# Patient Record
Sex: FEMALE | Race: WHITE | NOT HISPANIC OR LATINO | Employment: STUDENT | URBAN - METROPOLITAN AREA
[De-identification: names, ages, dates, MRNs, and addresses within clinical notes are randomized per-mention and may not be internally consistent; named-entity substitution may affect disease eponyms.]

---
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
## 2022-03-28 ENCOUNTER — OFFICE VISIT (OUTPATIENT)
Dept: URGENT CARE | Facility: CLINIC | Age: 22
End: 2022-03-28
Payer: COMMERCIAL

## 2022-03-28 VITALS
BODY MASS INDEX: 33.49 KG/M2 | WEIGHT: 201 LBS | SYSTOLIC BLOOD PRESSURE: 122 MMHG | HEART RATE: 79 BPM | TEMPERATURE: 96.8 F | RESPIRATION RATE: 18 BRPM | DIASTOLIC BLOOD PRESSURE: 60 MMHG | HEIGHT: 65 IN | OXYGEN SATURATION: 98 %

## 2022-03-28 DIAGNOSIS — H65.93 OME (OTITIS MEDIA WITH EFFUSION), BILATERAL: Primary | ICD-10-CM

## 2022-03-28 DIAGNOSIS — J04.0 ACUTE LARYNGITIS: ICD-10-CM

## 2022-03-28 PROCEDURE — 99203 OFFICE O/P NEW LOW 30 MIN: CPT | Performed by: PHYSICIAN ASSISTANT

## 2022-03-28 RX ORDER — METHYLPREDNISOLONE 4 MG/1
TABLET ORAL
Qty: 21 TABLET | Refills: 0 | Status: SHIPPED | OUTPATIENT
Start: 2022-03-28

## 2022-03-28 NOTE — PROGRESS NOTES
[facility]        NAME: Radha Benitez is a 25 y o  female  : 2000    MRN: [id number]  DATE: 2022  TIME: 9:55 AM    Assessment and Plan   OME (otitis media with effusion), bilateral [H65 [id number]]  1  OME (otitis media with effusion), bilateral  methylPREDNISolone 4 MG tablet therapy pack   2  Acute laryngitis       Recommend mucinex-d and flonase for symptomatic relief  Watch for signs of infection as discussed  Discussed strict return to care precautions as well as red flag symptoms which should prompt immediate ED referral  Pt verbalized understanding and is in agreement with plan  Please follow up with your primary care provider within the next week  Please remember that your visit today was with an urgent care provider and should not replace follow up with your primary care provider for chronic medical issues or annual physicals  Patient Instructions       Follow up with PCP in 3-5 days  Proceed to  ER if symptoms worsen  Chief Complaint     Chief Complaint   Patient presents with    Hearing Problem     loss of voice  since Friday  and hearing loss today         History of Present Illness       Radha Benitez is a(n) 25 y o  female presenting with URI symptoms x 4 days  Past medical history: none  Congestion: yes  Sore throat: no  Cough: no  Sputum production: no  Fever: no  Body aches: no  Loss of smell/taste: no  GI symptoms: no  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  OTC meds tried: none  Vaccinated against COVID19: yes        Review of Systems   Review of Systems   Constitutional: Negative for chills, diaphoresis, fatigue and fever  HENT: Positive for congestion, hearing loss, postnasal drip and voice change (hoarse)  Negative for ear pain, rhinorrhea, sinus pain, sneezing, sore throat and trouble swallowing  Eyes: Negative for pain and redness  Respiratory: Negative for cough, chest tightness, shortness of breath and wheezing      Cardiovascular: Negative for chest pain and leg swelling  Gastrointestinal: Negative for diarrhea, nausea and vomiting  Musculoskeletal: Negative for myalgias  Neurological: Negative for dizziness, weakness and headaches  Current Medications       Current Outpatient Medications:     acetaminophen (TYLENOL) 325 mg tablet, Take 650 mg by mouth every 6 (six) hours as needed for mild pain (Patient not taking: Reported on 3/28/2022 ), Disp: , Rfl:     ketorolac (TORADOL) 10 mg tablet, Take 1 tablet (10 mg total) by mouth every 6 (six) hours as needed for moderate pain for up to 5 days, Disp: 20 tablet, Rfl: 0    methylPREDNISolone 4 MG tablet therapy pack, Use as directed on package, Disp: 21 tablet, Rfl: 0    Current Allergies     Allergies as of 03/28/2022 - Reviewed 03/28/2022   Allergen Reaction Noted    Chocolate - food allergy Hives 07/22/2019            The following portions of the patient's history were reviewed and updated as appropriate: allergies, current medications, past family history, past medical history, past social history, past surgical history and problem list      Past Medical History:   Diagnosis Date    Ovarian cyst        Past Surgical History:   Procedure Laterality Date    NO PAST SURGERIES      [place] LAP,DIAGNOSTIC ABDOMEN N/A 10/20/2020    Procedure: Mar Fail;  Surgeon: Adán Ellis MD;  Location: [address] [place] [address];  Service: Gynecology       Family History   Problem Relation Age of Onset    Other Mother         Acid reflux    Allergy (severe) Mother         Sulfa drugs, penicillin      Cervical cancer Mother    [de-identified] Fibromyalgia Mother    [de-identified] Lupus Mother     Rheum arthritis Mother     Thyroid nodules Mother     Other Father         Acid reflux    Asthma Father     COPD Father     Crohn's disease Father     Lactose intolerance Father     Allergies Father         Seasonal          Medications have been verified          Objective   /60   Pulse 79   Temp (!) 96 8 °F (36 °C)   Resp 18   Ht 5' 5" (1 651 m)   Wt 91 2 kg (201 lb)   LMP 02/28/2022   SpO2 98%   BMI 33 45 kg/m²        Physical Exam     Physical Exam  Vitals and nursing note reviewed  Constitutional:       General: She is not in acute distress  Appearance: Normal appearance  She is not toxic-appearing  HENT:      Head: Normocephalic and atraumatic  Right Ear: Ear canal and external ear normal  A middle ear effusion (and negative cone of light) is present  Tympanic membrane is not erythematous or bulging  Left Ear: Ear canal and external ear normal  A middle ear effusion (and negative cone of light) is present  Tympanic membrane is not erythematous or bulging  Nose: No congestion  Mouth/Throat:      Mouth: Mucous membranes are moist       Pharynx: Oropharynx is clear  No oropharyngeal exudate or posterior oropharyngeal erythema  Eyes:      Conjunctiva/sclera: Conjunctivae normal       Pupils: Pupils are equal, round, and reactive to light  Cardiovascular:      Rate and Rhythm: Normal rate and regular rhythm  Heart sounds: Normal heart sounds  Pulmonary:      Effort: Pulmonary effort is normal  No respiratory distress  Breath sounds: Normal breath sounds  No wheezing, rhonchi or rales  Abdominal:      General: Abdomen is flat  Palpations: Abdomen is soft  Musculoskeletal:      Cervical back: Normal range of motion and neck supple  Skin:     General: Skin is warm and dry  Capillary Refill: Capillary refill takes less than 2 seconds  Neurological:      Mental Status: She is alert and oriented to person, place, and time     Psychiatric:         Behavior: Behavior normal

## 2022-03-28 NOTE — LETTER
March 28, 2022     Patient: Amee Jefferson   YOB: 2000   Date of Visit: 3/28/2022       To Whom it May Concern:    Amee Jefferson was seen in my clinic on 3/28/2022  She may return to work on 03/28/2022  If you have any questions or concerns, please don't hesitate to call           Sincerely,          Dawn Caro PA-C        CC: No Recipients

## 2022-12-08 ENCOUNTER — OFFICE VISIT (OUTPATIENT)
Dept: OBGYN CLINIC | Facility: CLINIC | Age: 22
End: 2022-12-08

## 2022-12-08 ENCOUNTER — APPOINTMENT (OUTPATIENT)
Dept: RADIOLOGY | Facility: CLINIC | Age: 22
End: 2022-12-08

## 2022-12-08 VITALS
DIASTOLIC BLOOD PRESSURE: 60 MMHG | SYSTOLIC BLOOD PRESSURE: 122 MMHG | WEIGHT: 201 LBS | HEART RATE: 79 BPM | HEIGHT: 65 IN | BODY MASS INDEX: 33.49 KG/M2

## 2022-12-08 DIAGNOSIS — R20.2 NUMBNESS AND TINGLING OF RIGHT ARM: Primary | ICD-10-CM

## 2022-12-08 DIAGNOSIS — R20.0 NUMBNESS AND TINGLING OF RIGHT ARM: Primary | ICD-10-CM

## 2022-12-08 DIAGNOSIS — G56.01 CARPAL TUNNEL SYNDROME ON RIGHT: ICD-10-CM

## 2022-12-08 DIAGNOSIS — M54.12 RADICULOPATHY, CERVICAL REGION: ICD-10-CM

## 2022-12-08 DIAGNOSIS — M54.2 NECK PAIN: ICD-10-CM

## 2022-12-08 NOTE — PROGRESS NOTES
Assessment/Plan:  1  Numbness and tingling of right arm  XR spine cervical 2 or 3 vw injury    Ambulatory Referral to PT/ [address] [facility] limited      2  Carpal tunnel syndrome on right  Ambulatory Referral to PT/OT Hand Therapy    [facility] limited      3  Radiculopathy, cervical region  Ambulatory Referral to PT/OT Hand Therapy        Leticia Anne has right-sided hand numbness which appears to be mostly consistent with carpal tunnel symptoms  She does have reproducible numbness with carpal tunnel testing today including a Durkan's test   Her cervical spine x-ray is unremarkable and I have low concern for any cervical radiculopathy although if her symptoms persist or worsen we could consider further imaging of the cervical spine  I recommended ultrasound of her right wrist to assess for carpal tunnel pathology  She was advised to continue sleeping in the night splint  Follow-up in the office after ultrasound is complete  Subjective:   Tao Handy is a 25 y o  female who presents to the office for evaluation for ongoing numbness in her right hand  She states she has been having issues in her right hand for the last 2 years  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[employer]  She will feel symptoms of numbness and tingling into the right hand that worsen when she is playing her instrument  It seems to come and go but is worsening more recently  She denies any trauma to the hand or elbow  She was told that it could be coming from her neck although she does not have any neck pain she did undergo some chiropractic adjustments which seem to help the situation sometimes  She was told she may have carpal tunnel symptoms in the past and was advised to wear a night splint on her wrist and states that this does not help    She feels numbness and tingling into all of her fingers but states that the middle finger seems to be the worst   She also feels a general soreness in her forearm with playing instruments as well       Review of Systems   Constitutional: Negative for chills, fever and unexpected weight change  HENT: Negative for hearing loss, nosebleeds and sore throat  Eyes: Negative for pain, redness and visual disturbance  Respiratory: Negative for cough, shortness of breath and wheezing  Cardiovascular: Negative for chest pain, palpitations and leg swelling  Gastrointestinal: Negative for abdominal pain, nausea and vomiting  Endocrine: Negative for polydipsia and polyuria  Genitourinary: Negative for dysuria and hematuria  Musculoskeletal:        See HPI   Skin: Negative for rash and wound  Neurological: Negative for dizziness, numbness and headaches  Psychiatric/Behavioral: Negative for decreased concentration and suicidal ideas  The patient is not nervous/anxious            Past Medical History:   Diagnosis Date   • Ovarian cyst        Past Surgical History:   Procedure Laterality Date   • NO PAST SURGERIES     • [place] LAP,DIAGNOSTIC ABDOMEN N/A 10/20/2020    Procedure: DIAGNOSTIC LAPAROSCOPY,  CYSTOSCOPY;  Surgeon: Tomas Cabot, MD;  Location: [address] [place] [address];  Service: Gynecology       Family History   Problem Relation Age of Onset   • Other Mother         Acid reflux   • Allergy (severe) Mother         Sulfa drugs, penicillin     • Cervical cancer Mother    • Fibromyalgia Mother    • Lupus Mother    • Rheum arthritis Mother    • Thyroid nodules Mother    • Other Father         Acid reflux   • Asthma Father    • COPD Father    • Crohn's disease Father    • Lactose intolerance Father    • Allergies Father         Seasonal        Social History     Occupational History   • Not on file   Tobacco Use   • Smoking status: Never   • Smokeless tobacco: Never   Substance and Sexual Activity   • Alcohol use: No   • Drug use: No   • Sexual activity: Yes         Current Outpatient Medications:   •  acetaminophen (TYLENOL) 325 mg tablet, Take 650 mg by mouth every 6 (six) hours as needed for mild pain (Patient not taking: Reported on 3/28/2022 ), Disp: , Rfl:   •  ketorolac (TORADOL) 10 mg tablet, Take 1 tablet (10 mg total) by mouth every 6 (six) hours as needed for moderate pain for up to 5 days, Disp: 20 tablet, Rfl: 0  •  methylPREDNISolone 4 MG tablet therapy pack, Use as directed on package, Disp: 21 tablet, Rfl: 0    Allergies   Allergen Reactions   • Chocolate - Food Allergy Hives       Objective:  Vitals:    12/08/22 1449   BP: 122/60   Pulse: 79       Right Hand Exam     Tenderness   Right hand tenderness location: Discomfort to palpation over flexor and extensor compartment of wrist and forearm  Range of Motion   Wrist   Extension: normal   Flexion: normal   Pronation: normal   Supination: normal     Muscle Strength   Wrist extension: 5/5   Wrist flexion: 5/5   : 5/5     Tests   Tinel's sign (median nerve): positive    Other   Erythema: absent  Sensation: normal  Pulse: present    Comments:  Positive Durkan's test          Strength/Myotome Testing     Right Wrist/Hand   Wrist extension: 5  Wrist flexion: 5    Tests     Right Wrist/Hand   Positive Tinel's sign (medial nerve)  Physical Exam  Vitals and nursing note reviewed  Constitutional:       Appearance: She is well-developed  HENT:      Head: Normocephalic and atraumatic  Eyes:      General: No scleral icterus  Extraocular Movements: Extraocular movements intact  Conjunctiva/sclera: Conjunctivae normal    Neck:      Comments: Discomfort the right arm with Spurling's maneuver  Cardiovascular:      Rate and Rhythm: Normal rate  Pulmonary:      Effort: Pulmonary effort is normal  No respiratory distress  Musculoskeletal:      Cervical back: Normal range of motion and neck supple  No spinous process tenderness or muscular tenderness  Normal range of motion  Comments: As noted in HPI   Skin:     General: Skin is warm and dry  Neurological:      Mental Status: She is alert and oriented to person, place, and time  Psychiatric:         Behavior: Behavior normal          I have personally reviewed pertinent films in PACS and my interpretation is as follows:  X-rays of the of the cervical spine demonstrate no evidence of acute fracture or significant degenerative change  This document was created using speech voice recognition software  Grammatical errors, random word insertions, pronoun errors, and incomplete sentences are an occasional consequence of this system due to software limitations, ambient noise, and hardware issues  Any formal questions or concerns about content, text, or information contained within the body of this dictation should be directly addressed to the provider for clarification

## 2023-01-09 ENCOUNTER — OFFICE VISIT (OUTPATIENT)
Dept: URGENT CARE | Facility: CLINIC | Age: 23
End: 2023-01-09

## 2023-01-09 VITALS
RESPIRATION RATE: 18 BRPM | WEIGHT: 212.8 LBS | BODY MASS INDEX: 35.45 KG/M2 | TEMPERATURE: 97.2 F | OXYGEN SATURATION: 97 % | HEART RATE: 71 BPM | HEIGHT: 65 IN

## 2023-01-09 DIAGNOSIS — H57.12 LEFT EYE PAIN: Primary | ICD-10-CM

## 2023-01-09 NOTE — PROGRESS NOTES
[facility] [facility]        NAME: Shaun Snider is a 25 y o  female  : 2000    MRN: [id number]  DATE: 2023  TIME: 12:33 PM    Assessment and Plan   Left eye pain [H57 12]  1  Left eye pain              Patient Instructions   Patient Instructions   I do not see a contact lens even with everting the eyelid  Follow-up with your eye doctor if pain persist         Follow up with PCP in 3-5 days  Proceed to  ER if symptoms worsen  Chief Complaint     Chief Complaint   Patient presents with   • Foreign Body in [address] [place] [address] in  eye         History of Present Illness       The patient is a 71-year-old female presenting today because she thinks her contact lenses stuck in her left eye  She denies any prior episodes  Is a contact wearer for the last 5 years  She reports pain in the left eye  Has been wearing glasses and has normal vision with the glasses  Review of Systems   Review of Systems   Eyes: Positive for pain and redness  Negative for photophobia, discharge, itching and visual disturbance           Current Medications       Current Outpatient Medications:   •  acetaminophen (TYLENOL) 325 mg tablet, Take 650 mg by mouth every 6 (six) hours as needed for mild pain (Patient not taking: Reported on 3/28/2022 ), Disp: , Rfl:   •  ketorolac (TORADOL) 10 mg tablet, Take 1 tablet (10 mg total) by mouth every 6 (six) hours as needed for moderate pain for up to 5 days, Disp: 20 tablet, Rfl: 0  •  methylPREDNISolone 4 MG tablet therapy pack, Use as directed on package, Disp: 21 tablet, Rfl: 0    Current Allergies     Allergies as of 2023 - Reviewed 2023   Allergen Reaction Noted   • Chocolate - food allergy Hives 2019            The following portions of the patient's history were reviewed and updated as appropriate: allergies, current medications, past family history, past medical history, past social history, past surgical history and problem list      Past Medical History:   Diagnosis Date   • Ovarian cyst        Past Surgical History:   Procedure Laterality Date   • NO PAST SURGERIES     • ND LAPS ABD PRTM&OMENTUM DX W/WO SPEC BR/WA SPX N/A 10/20/2020    Procedure: DIAGNOSTIC LAPAROSCOPY,  CYSTOSCOPY;  Surgeon: Kianna Saunders MD;  Location: [address] [place] [address];  Service: Gynecology       Family History   Problem Relation Age of Onset   • Other Mother         Acid reflux   • Allergy (severe) Mother         Sulfa drugs, penicillin     • Cervical cancer Mother    • Fibromyalgia Mother    • Lupus Mother    • Rheum arthritis Mother    • Thyroid nodules Mother    • Other Father         Acid reflux   • Asthma Father    • COPD Father    • Crohn's disease Father    • Lactose intolerance Father    • Allergies Father         Seasonal          Medications have been verified  Objective   Pulse 71   Temp (!) 97 2 °F (36 2 °C)   Resp 18   Ht 5' 5" (1 651 m)   Wt 96 5 kg (212 lb 12 8 oz)   SpO2 97%   BMI 35 41 kg/m²        Physical Exam     Physical Exam  Vitals and nursing note reviewed  Constitutional:       General: She is not in acute distress  Appearance: Normal appearance  She is normal weight  She is not ill-appearing, toxic-appearing or diaphoretic  HENT:      Head: Normocephalic and atraumatic  Eyes:      General: No scleral icterus  Right eye: No discharge  Left eye: No discharge  Extraocular Movements: Extraocular movements intact  Pupils: Pupils are equal, round, and reactive to light  Comments: Erythema of left conjunctiva   The eye was numbed and stained  There were multiple small areas of uptake which possibly represent corneal abrasions from trying to get the contact out  Cardiovascular:      Rate and Rhythm: Normal rate and regular rhythm  Heart sounds: Normal heart sounds  No murmur heard  No friction rub  No gallop  Pulmonary:      Effort: Pulmonary effort is normal  No respiratory distress        Breath sounds: Normal breath sounds  No stridor  No wheezing, rhonchi or rales  Chest:      Chest wall: No tenderness  Skin:     General: Skin is warm and dry  Capillary Refill: Capillary refill takes less than 2 seconds  Neurological:      Mental Status: She is alert

## 2023-01-09 NOTE — PATIENT INSTRUCTIONS
I do not see a contact lens even with everting the eyelid    Follow-up with your eye doctor if pain persist

## 2023-01-18 ENCOUNTER — OFFICE VISIT (OUTPATIENT)
Dept: GASTROENTEROLOGY | Facility: CLINIC | Age: 23
End: 2023-01-18

## 2023-01-18 VITALS
SYSTOLIC BLOOD PRESSURE: 120 MMHG | BODY MASS INDEX: 35.32 KG/M2 | HEIGHT: 65 IN | DIASTOLIC BLOOD PRESSURE: 75 MMHG | HEART RATE: 80 BPM | WEIGHT: 212 LBS

## 2023-01-18 DIAGNOSIS — K52.9 CHRONIC DIARRHEA: Primary | ICD-10-CM

## 2023-01-18 NOTE — PROGRESS NOTES
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  Presents with chronic diarrhea  Her frequent bowel motions associated with abdominal cramping started back in August   She can go 5-6 times a day she is occasionally awoken with years to go to the bathroom  She has not lost any weight  She denies any blood or fevers  No antibiotics or recent travel  Her father has Crohn's disease  To otherwise follow-up in 4 months   - CBC and Platelet; Future  - Comprehensive metabolic panel; Future  - C-reactive protein; Future  - Sedimentation rate, automated; Future  - Clostridium difficile toxin by PCR; Future  - Stool culture; Future  - White Blood Cells, Stool by Gram Stain; Future  - Ova and parasite examination; Future  - Celiac Disease Antibody Profile; Future  - Colonoscopy; Future  - CT abdomen pelvis w contrast; Future    ______________________________________________________________________    HPI: Very 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 presents with chronic diarrhea since August of this year  She has 5-6 bowel movements a day occasionally awoken  No blood no fevers no travel no antibiotics  Father has Crohn's disease otherwise no significant family history  She denies any upper GI symptoms  She has underlying PCO  REVIEW OF SYSTEMS:    CONSTITUTIONAL: Denies any fever, chills, rigors, and weight loss  HEENT: No earache or tinnitus  Denies hearing loss or visual disturbances  CARDIOVASCULAR: No chest pain or palpitations  RESPIRATORY: Denies any cough, hemoptysis, shortness of breath or dyspnea on exertion  GASTROINTESTINAL: As noted in the History of Present Illness  GENITOURINARY: No problems with urination  Denies any hematuria or dysuria    NEUROLOGIC: No dizziness or vertigo, denies headaches  MUSCULOSKELETAL: Denies any muscle or joint pain  SKIN: Denies skin rashes or itching  ENDOCRINE: Denies excessive thirst  Denies intolerance to heat or cold  PSYCHOSOCIAL: Denies depression or anxiety  Denies any recent memory loss  Historical Information   Past Medical History:   Diagnosis Date   • Ovarian cyst    PCO syndrome  Past Surgical History:   Procedure Laterality Date   • NO PAST SURGERIES     • [place] LAPS ABD PRTM&OMENTUM DX W/WO SPEC BR/WA SPX N/A 10/20/2020    Procedure: DIAGNOSTIC LAPAROSCOPY,  CYSTOSCOPY;  Surgeon: Morris So MD;  Location: [facility];  Service: Gynecology     Social History   Social History     Substance and Sexual Activity   Alcohol Use No     Social History     Substance and Sexual Activity   Drug Use No     Social History     Tobacco Use   Smoking Status Never   Smokeless Tobacco Never     Family History   Problem Relation Age of Onset   • Other Mother         Acid reflux   • Allergy (severe) Mother         Sulfa drugs, penicillin     • Cervical cancer Mother    • Fibromyalgia Mother    • Lupus Mother    • Rheum arthritis Mother    • Thyroid nodules Mother    • Other Father         Acid reflux   • Asthma Father    • COPD Father    • Crohn's disease Father    • Lactose intolerance Father    • Allergies Father         Seasonal        Meds/Allergies       Current Outpatient Medications:   •  acetaminophen (TYLENOL) 325 mg tablet  •  ketorolac (TORADOL) 10 mg tablet  •  methylPREDNISolone 4 MG tablet therapy pack    Allergies   Allergen Reactions   • Chocolate - Food Allergy Hives           Objective     Blood pressure 120/75, pulse 80, height 5' 5" (1 651 m), weight 96 2 kg (212 lb), not currently breastfeeding  Body mass index is 35 28 kg/m²          PHYSICAL EXAM:      General Appearance:   Alert, cooperative, no distress   HEENT:   Normocephalic, atraumatic, anicteric      Neck:  Supple, symmetrical, trachea midline   Lungs:   Clear to auscultation bilaterally; no rales, rhonchi or wheezing; respirations unlabored    Heart[de-identified]   Regular rate and rhythm; no murmur, rub, or gallop  Abdomen:   Soft, non-tender, non-distended; normal bowel sounds; no masses, no organomegaly    Genitalia:   Deferred    Rectal:   Deferred    Extremities:  No cyanosis, clubbing or edema    Pulses:  2+ and symmetric    Skin:  No jaundice, rashes, or lesions    Lymph nodes:  No palpable cervical lymphadenopathy        Lab Results:   No visits with results within 1 Day(s) from this visit  Latest known visit with results is:   Admission on 10/20/2020, Discharged on 10/20/2020   Component Date Value   • EXT Preg Test, Ur 10/20/2020 Negative    • Control 10/20/2020 Valid          Radiology Results:   No results found    Answers for HPI/ROS submitted by the patient on 1/17/2023  Chronicity: recurrent  Onset: more than 1 year ago  Onset quality: gradual  Frequency: daily  Episode duration: 3 Hours  Progression since onset: waxing and waning  Pain location: generalized abdominal region  Pain - numeric: 9/10  Pain quality: cramping  Radiates to: back, pelvis  anorexia: No  arthralgias: No  belching: No  constipation: Yes  diarrhea: Yes  dysuria: No  fever: No  flatus: Yes  frequency: No  headaches: Yes  hematochezia: No  hematuria: No  melena: No  myalgias: Yes  nausea: Yes  weight loss: No  vomiting: No  Aggravated by: certain positions, movement, palpation  Relieved by: nothing  Diagnostic workup: surgery, ultrasound

## 2023-01-18 NOTE — PATIENT INSTRUCTIONS
Scheduled date of colonoscopy (as of today): 2/24/23  Physician performing colonoscopy: Dr Jossie Alcantara  Location of colonoscopy: [facility]  Bowel prep reviewed with patient: Miralax/Dulcolax  Instructions reviewed with patient by:  Clearances:  N/A

## 2023-02-04 ENCOUNTER — HOSPITAL ENCOUNTER (OUTPATIENT)
Dept: CT IMAGING | Facility: HOSPITAL | Age: 23
Discharge: HOME | End: 2023-02-04
Attending: INTERNAL MEDICINE

## 2023-02-04 ENCOUNTER — LAB (OUTPATIENT)
Dept: LAB | Facility: HOSPITAL | Age: 23
End: 2023-02-04
Attending: INTERNAL MEDICINE

## 2023-02-04 DIAGNOSIS — K52.9 CHRONIC DIARRHEA: ICD-10-CM

## 2023-02-04 LAB
ALBUMIN SERPL BCP-MCNC: 4.4 G/DL (ref 3.5–5)
ALP SERPL-CCNC: 54 U/L (ref 34–104)
ALT SERPL W P-5'-P-CCNC: 10 U/L (ref 7–52)
ANION GAP SERPL CALCULATED.3IONS-SCNC: 9 MMOL/L (ref 4–13)
AST SERPL W P-5'-P-CCNC: 10 U/L (ref 13–39)
BILIRUB SERPL-MCNC: 0.56 MG/DL (ref 0.2–1)
BUN SERPL-MCNC: 10 MG/DL (ref 5–25)
CALCIUM SERPL-MCNC: 8.8 MG/DL (ref 8.4–10.2)
CHLORIDE SERPL-SCNC: 101 MMOL/L (ref 96–108)
CO2 SERPL-SCNC: 26 MMOL/L (ref 21–32)
CREAT SERPL-MCNC: 0.78 MG/DL (ref 0.6–1.3)
CRP SERPL QL: 5.8 MG/L
ERYTHROCYTE [DISTWIDTH] IN BLOOD BY AUTOMATED COUNT: 12.1 % (ref 11.6–15.1)
ERYTHROCYTE [SEDIMENTATION RATE] IN BLOOD: 11 MM/HOUR (ref 0–20)
GFR SERPL CREATININE-BSD FRML MDRD: 109 ML/MIN/1.73SQ M
GLUCOSE P FAST SERPL-MCNC: 93 MG/DL (ref 65–99)
HCT VFR BLD AUTO: 39 % (ref 34.8–46.1)
HGB BLD-MCNC: 12.9 G/DL (ref 11.5–15.4)
MCH RBC QN AUTO: 29.8 PG (ref 26.8–34.3)
MCHC RBC AUTO-ENTMCNC: 33.1 G/DL (ref 31.4–37.4)
MCV RBC AUTO: 90 FL (ref 82–98)
PLATELET # BLD AUTO: 214 THOUSANDS/UL (ref 149–390)
PMV BLD AUTO: 11.7 FL (ref 8.9–12.7)
POTASSIUM SERPL-SCNC: 3.6 MMOL/L (ref 3.5–5.3)
PROT SERPL-MCNC: 7.3 G/DL (ref 6.4–8.4)
RBC # BLD AUTO: 4.33 MILLION/UL (ref 3.81–5.12)
SODIUM SERPL-SCNC: 136 MMOL/L (ref 135–147)
WBC # BLD AUTO: 5.17 THOUSAND/UL (ref 4.31–10.16)

## 2023-02-04 RX ADMIN — IOHEXOL 100 ML: 350 INJECTION, SOLUTION INTRAVENOUS at 10:05

## 2023-02-06 LAB
ENDOMYSIUM IGA SER QL: NEGATIVE
GLIADIN PEPTIDE IGA SER-ACNC: 5 UNITS (ref 0–19)
GLIADIN PEPTIDE IGG SER-ACNC: 3 UNITS (ref 0–19)
IGA SERPL-MCNC: 294 MG/DL (ref 87–352)
TTG IGA SER-ACNC: <2 U/ML (ref 0–3)
TTG IGG SER-ACNC: 4 U/ML (ref 0–5)

## 2023-02-06 NOTE — RESULT ENCOUNTER NOTE
The results were unremarkable/negative except for one of the inflammatory markers which was minimally elevated  This was communicated on my chart

## 2023-06-10 ENCOUNTER — TELEPHONE (OUTPATIENT)
Dept: OTHER | Facility: OTHER | Age: 23
End: 2023-06-10

## 2023-06-12 ENCOUNTER — TELEPHONE (OUTPATIENT)
Dept: GASTROENTEROLOGY | Facility: CLINIC | Age: 23
End: 2023-06-12

## 2023-06-12 NOTE — TELEPHONE ENCOUNTER
Scheduled date of colonoscopy (as of today):06/27/2023  Physician performing colonoscopy:Dr Valente Mast  Location of colonoscopy:[facility]  Clearances: n/a

## 2023-06-12 NOTE — TELEPHONE ENCOUNTER
I lmom for pt to please call back to reschedule her colonoscopy with Dr Uli Hutchins  Will call pt again in one week if do not hear back from her

## 2023-06-18 RX ORDER — LEVONORGESTREL 52 MG/1
INTRAUTERINE DEVICE INTRAUTERINE
COMMUNITY
Start: 2022-07-07

## 2023-06-18 RX ORDER — KETOCONAZOLE 20 MG/G
CREAM TOPICAL
COMMUNITY
Start: 2023-06-09 | End: 2023-06-20

## 2023-06-18 RX ORDER — DOXYCYCLINE HYCLATE 100 MG/1
100 CAPSULE ORAL 2 TIMES DAILY
COMMUNITY
Start: 2023-06-09 | End: 2023-06-20

## 2023-06-20 ENCOUNTER — OFFICE VISIT (OUTPATIENT)
Dept: FAMILY MEDICINE CLINIC | Facility: CLINIC | Age: 23
End: 2023-06-20
Payer: COMMERCIAL

## 2023-06-20 VITALS
HEART RATE: 72 BPM | WEIGHT: 205.13 LBS | HEIGHT: 65 IN | SYSTOLIC BLOOD PRESSURE: 114 MMHG | DIASTOLIC BLOOD PRESSURE: 82 MMHG | TEMPERATURE: 98 F | RESPIRATION RATE: 18 BRPM | BODY MASS INDEX: 34.18 KG/M2

## 2023-06-20 DIAGNOSIS — Z00.00 HEALTHCARE MAINTENANCE: Primary | ICD-10-CM

## 2023-06-20 PROCEDURE — 99395 PREV VISIT EST AGE 18-39: CPT | Performed by: FAMILY MEDICINE

## 2023-06-20 NOTE — PROGRESS NOTES
"Assessment/Plan:    No problem-specific Assessment & Plan notes found for this encounter  cpe  Work form done  PPD not needed    bmi and wt loss discussed  Has PCOS     Diagnoses and all orders for this visit:    Healthcare maintenance    Other orders  -     Discontinue: doxycycline hyclate (VIBRAMYCIN) 100 mg capsule; Take 100 mg by mouth 2 (two) times a day (Patient not taking: Reported on 6/20/2023)  -     Discontinue: ketoconazole (NIZORAL) 2 % cream; APPLY CREAM TOPICALLY TWICE DAILY TO THE GROIN RASH FOR 1 TO 2 WEEKS (Patient not taking: Reported on 6/20/2023)  -     Levonorgestrel (Mirena, 52 MG,) 20 MCG/DAY IUD        No follow-ups on file  Subjective:      Patient ID: Angelia Galdamez is a 26 y o  female  Chief Complaint   Patient presents with   • Annual Exam     Lw cma       HPI    Flute and clarinet and sax    quantiferon done and normal    Has IUD  Sexually active, safe sex with condom  Hx of PCOS  Gyn Dr William Mooney [facility]    No mental issues    The following portions of the patient's history were reviewed and updated as appropriate: allergies, current medications, past family history, past medical history, past social history, past surgical history and problem list     Review of Systems   Respiratory: Negative for shortness of breath  Psychiatric/Behavioral: Negative for dysphoric mood  The patient is not nervous/anxious  Current Outpatient Medications   Medication Sig Dispense Refill   • Levonorgestrel (Mirena, 52 MG,) 20 MCG/DAY IUD        No current facility-administered medications for this visit  Objective:    /82   Pulse 72   Temp 98 °F (36 7 °C) (Tympanic)   Resp 18   Ht 5' 5\" (1 651 m)   Wt 93 kg (205 lb 2 oz)   LMP  (LMP Unknown)   BMI 34 13 kg/m²        Physical Exam  Vitals and nursing note reviewed  Constitutional:       General: She is not in acute distress  Appearance: She is well-developed  She is obese   She is not " ill-appearing or toxic-appearing  HENT:      Head: Normocephalic  Right Ear: Tympanic membrane and external ear normal       Left Ear: Tympanic membrane and external ear normal       Nose: No congestion  Mouth/Throat:      Pharynx: No oropharyngeal exudate  Eyes:      General: No scleral icterus  Conjunctiva/sclera: Conjunctivae normal    Cardiovascular:      Rate and Rhythm: Normal rate and regular rhythm  Heart sounds: No murmur heard  Pulmonary:      Effort: Pulmonary effort is normal  No respiratory distress  Breath sounds: No wheezing  Abdominal:      General: There is no distension  Palpations: Abdomen is soft  Tenderness: There is no abdominal tenderness  Musculoskeletal:         General: No deformity  Cervical back: Neck supple  Right lower leg: No edema  Left lower leg: No edema  Skin:     General: Skin is warm and dry  Coloration: Skin is not pale  Findings: No rash  Neurological:      Mental Status: She is alert  Gait: Gait normal       Deep Tendon Reflexes: Reflexes normal    Psychiatric:         Mood and Affect: Mood normal          Behavior: Behavior normal          Thought Content: Thought content normal        BMI Counseling: Body mass index is 34 13 kg/m²  The BMI is above normal  Nutrition recommendations include decreasing portion sizes and moderation in carbohydrate intake  Exercise recommendations include exercising 3-5 times per week  No pharmacotherapy was ordered  Rationale for BMI follow-up plan is due to patient being overweight or obese  Depression Screening and Follow-up Plan: Patient was screened for depression during today's encounter  They screened negative with a PHQ-2 score of 0               Stephon Andrade DO

## 2023-06-27 ENCOUNTER — ANESTHESIA EVENT (OUTPATIENT)
Dept: GASTROENTEROLOGY | Facility: AMBULARY SURGERY CENTER | Age: 23
End: 2023-06-27

## 2023-06-27 ENCOUNTER — ANESTHESIA (OUTPATIENT)
Dept: GASTROENTEROLOGY | Facility: AMBULARY SURGERY CENTER | Age: 23
End: 2023-06-27

## 2023-06-27 ENCOUNTER — HOSPITAL ENCOUNTER (OUTPATIENT)
Dept: GASTROENTEROLOGY | Facility: AMBULARY SURGERY CENTER | Age: 23
Setting detail: OUTPATIENT SURGERY
Discharge: HOME | End: 2023-06-27
Attending: INTERNAL MEDICINE
Payer: COMMERCIAL

## 2023-06-27 VITALS
TEMPERATURE: 97.9 F | OXYGEN SATURATION: 99 % | RESPIRATION RATE: 16 BRPM | SYSTOLIC BLOOD PRESSURE: 103 MMHG | HEART RATE: 62 BPM | DIASTOLIC BLOOD PRESSURE: 53 MMHG

## 2023-06-27 DIAGNOSIS — K52.9 CHRONIC DIARRHEA: ICD-10-CM

## 2023-06-27 DIAGNOSIS — R10.9 ABDOMINAL SPASMS: Primary | ICD-10-CM

## 2023-06-27 LAB
EXT PREGNANCY TEST URINE: NEGATIVE
EXT. CONTROL: NORMAL

## 2023-06-27 PROCEDURE — 45380 COLONOSCOPY AND BIOPSY: CPT | Performed by: INTERNAL MEDICINE

## 2023-06-27 PROCEDURE — 88305 TISSUE EXAM BY PATHOLOGIST: CPT | Performed by: PATHOLOGY

## 2023-06-27 PROCEDURE — 81025 URINE PREGNANCY TEST: CPT | Performed by: ANESTHESIOLOGY

## 2023-06-27 RX ORDER — SODIUM CHLORIDE, SODIUM LACTATE, POTASSIUM CHLORIDE, CALCIUM CHLORIDE 600; 310; 30; 20 MG/100ML; MG/100ML; MG/100ML; MG/100ML
125 INJECTION, SOLUTION INTRAVENOUS CONTINUOUS
Status: DISCONTINUED | OUTPATIENT
Start: 2023-06-27 | End: 2023-07-01 | Stop reason: HOSPADM

## 2023-06-27 RX ORDER — SODIUM CHLORIDE, SODIUM LACTATE, POTASSIUM CHLORIDE, CALCIUM CHLORIDE 600; 310; 30; 20 MG/100ML; MG/100ML; MG/100ML; MG/100ML
INJECTION, SOLUTION INTRAVENOUS CONTINUOUS PRN
Status: DISCONTINUED | OUTPATIENT
Start: 2023-06-27 | End: 2023-06-27

## 2023-06-27 RX ORDER — PROPOFOL 10 MG/ML
INJECTION, EMULSION INTRAVENOUS CONTINUOUS PRN
Status: DISCONTINUED | OUTPATIENT
Start: 2023-06-27 | End: 2023-06-27

## 2023-06-27 RX ORDER — DICYCLOMINE HYDROCHLORIDE 10 MG/1
10 CAPSULE ORAL
Qty: 120 CAPSULE | Refills: 1 | Status: SHIPPED | OUTPATIENT
Start: 2023-06-27

## 2023-06-27 RX ORDER — PROPOFOL 10 MG/ML
INJECTION, EMULSION INTRAVENOUS AS NEEDED
Status: DISCONTINUED | OUTPATIENT
Start: 2023-06-27 | End: 2023-06-27

## 2023-06-27 RX ORDER — LIDOCAINE HYDROCHLORIDE 10 MG/ML
INJECTION, SOLUTION EPIDURAL; INFILTRATION; INTRACAUDAL; PERINEURAL AS NEEDED
Status: DISCONTINUED | OUTPATIENT
Start: 2023-06-27 | End: 2023-06-27

## 2023-06-27 RX ADMIN — LIDOCAINE HYDROCHLORIDE 50 MG: 10 INJECTION, SOLUTION EPIDURAL; INFILTRATION; INTRACAUDAL; PERINEURAL at 11:05

## 2023-06-27 RX ADMIN — PROPOFOL 130 MCG/KG/MIN: 10 INJECTION, EMULSION INTRAVENOUS at 11:05

## 2023-06-27 RX ADMIN — SODIUM CHLORIDE, SODIUM LACTATE, POTASSIUM CHLORIDE, AND CALCIUM CHLORIDE 125 ML/HR: .6; .31; .03; .02 INJECTION, SOLUTION INTRAVENOUS at 10:55

## 2023-06-27 RX ADMIN — SODIUM CHLORIDE, SODIUM LACTATE, POTASSIUM CHLORIDE, AND CALCIUM CHLORIDE: .6; .31; .03; .02 INJECTION, SOLUTION INTRAVENOUS at 10:54

## 2023-06-27 RX ADMIN — PROPOFOL 100 MG: 10 INJECTION, EMULSION INTRAVENOUS at 11:05

## 2023-06-27 NOTE — H&P
History and Physical -  [facility]  Hong Mccloud 26 y o  female MRN: [id number]                  HPI: Hong Mccloud is a 26y o  year old female who presents for colonoscopy due to history of chronic diarrhea and family history of Crohn's disease  REVIEW OF SYSTEMS: Per the HPI, and otherwise unremarkable      Historical Information   Past Medical History:   Diagnosis Date   • Abdominal pain     diarrhea, constipstion-   • Contact lens/glasses fitting    • Family history of Crohn's disease     Father   • IUD (intrauterine device) in place     mirena   • Ovarian cyst     PCOS     Past Surgical History:   Procedure Laterality Date   • [place] LAPS ABD PRTM&OMENTUM DX W/WO SPEC BR/WA SPX N/A 10/20/2020    Procedure: DIAGNOSTIC LAPAROSCOPY,  CYSTOSCOPY;  Surgeon: Raimundo Andrade MD;  Location: [facility];  Service: Gynecology     Social History   Social History     Substance and Sexual Activity   Alcohol Use Not Currently     Social History     Substance and Sexual Activity   Drug Use No     Social History     Tobacco Use   Smoking Status Never   Smokeless Tobacco Never     Family History   Problem Relation Age of Onset   • Other Mother         Acid reflux   • Allergy (severe) Mother         Sulfa drugs, penicillin     • Cervical cancer Mother    • Fibromyalgia Mother    • Lupus Mother    • Rheum arthritis Mother    • Thyroid nodules Mother    • Other Father         Acid reflux   • Asthma Father    • COPD Father    • Crohn's disease Father    • Lactose intolerance Father    • Allergies Father         Seasonal        Meds/Allergies       Current Outpatient Medications:   •  Levonorgestrel (Mirena, 52 MG,) 20 MCG/DAY IUD  •  Polyethylene Glycol 3350 (MIRALAX PO)    Current Facility-Administered Medications:   •  lactated ringers infusion, 125 mL/hr, Intravenous, Continuous    Allergies   Allergen Reactions   • Chocolate - Food Allergy Hives       Objective     /73   Pulse 66   Temp 97 9 °F (36 6 °C)   Resp 18   LMP 04/12/2023 (Exact Date)   SpO2 99%       PHYSICAL EXAM    Gen: NAD  Head: NCAT  CV: RRR  CHEST: Clear  ABD: soft, NT/ND  EXT: no edema      ASSESSMENT/PLAN:  This is a 26y o  year old female here for colonoscopy, and she is stable and optimized for her procedure

## 2023-06-27 NOTE — ANESTHESIA POSTPROCEDURE EVALUATION
Post-Op Assessment Note    CV Status:  Stable  Pain Score: 0    Pain management: adequate     Mental Status:  Sleepy   Hydration Status:  Euvolemic   PONV Controlled:  Controlled   Airway Patency:  Patent      Post Op Vitals Reviewed: Yes      Staff: Anesthesiologist, CRNA         No notable events documented      BP 97/57 (06/27/23 1116)    Temp      Pulse 77 (06/27/23 1116)   Resp 12 (06/27/23 1116)    SpO2 99 % (06/27/23 1116)

## 2023-06-27 NOTE — ANESTHESIA PREPROCEDURE EVALUATION
Procedure:  COLONOSCOPY    Relevant Problems   No relevant active problems        Physical Exam    Airway    Mallampati score: II  TM Distance: >3 FB  Neck ROM: full     Dental   No notable dental hx     Cardiovascular  Cardiovascular exam normal    Pulmonary  Pulmonary exam normal     Other Findings        Anesthesia Plan  ASA Score- 2     Anesthesia Type- IV sedation with anesthesia with ASA Monitors  Additional Monitors:   Airway Plan:           Plan Factors-Exercise tolerance (METS): >4 METS  Chart reviewed  Existing labs reviewed  Patient summary reviewed  Patient is not a current smoker  Obstructive sleep apnea risk education given perioperatively  Induction-     Postoperative Plan-     Informed Consent- Anesthetic plan and risks discussed with patient  I personally reviewed this patient with the CRNA  Discussed and agreed on the Anesthesia Plan with the GUILLE Macario

## 2023-06-30 PROCEDURE — 88305 TISSUE EXAM BY PATHOLOGIST: CPT | Performed by: PATHOLOGY

## 2023-10-11 ENCOUNTER — OFFICE VISIT (OUTPATIENT)
Dept: URGENT CARE | Facility: CLINIC | Age: 23
End: 2023-10-11
Payer: COMMERCIAL

## 2023-10-11 VITALS
WEIGHT: 198 LBS | BODY MASS INDEX: 32.95 KG/M2 | RESPIRATION RATE: 12 BRPM | OXYGEN SATURATION: 97 % | TEMPERATURE: 98.9 F | HEART RATE: 78 BPM

## 2023-10-11 DIAGNOSIS — B34.9 ACUTE VIRAL SYNDROME: Primary | ICD-10-CM

## 2023-10-11 PROCEDURE — 99213 OFFICE O/P EST LOW 20 MIN: CPT | Performed by: PHYSICIAN ASSISTANT

## 2023-10-11 RX ORDER — ONDANSETRON 4 MG/1
4-8 TABLET, FILM COATED ORAL EVERY 8 HOURS PRN
Qty: 20 TABLET | Refills: 0 | Status: SHIPPED | OUTPATIENT
Start: 2023-10-11

## 2023-10-11 NOTE — PROGRESS NOTES
[facility]        NAME: Kadie Doe is a 26 y.o. female  : 2000    MRN: [id number]  DATE: 2023  TIME: 2:17 PM    Assessment and Plan   Acute viral syndrome [B34.9]  1. Acute viral syndrome  ondansetron (ZOFRAN) 4 mg tablet            Patient Instructions     1. Increase oral fluids. 2.  Observe a liquid diet until your symptoms resolves. 3.  When you begin to advance your diet, begin with the brat diet as discussed. 4.  Go to the ER immediately for any significantly worsening symptoms. 5.  Follow-up with your primary care provider in 5 to 7 days for any persistent symptoms. Chief Complaint     Chief Complaint   Patient presents with    Fever     Pt presents with fever ( 101.1F), nausea, vomit x 3 post brushing teeth// started on Monday;; Covid test x 3 negative// needs a work note         History of Present Illness       41-year-old female patient with a 3-day history of fever and nausea. Patient denies any other symptoms. She denies fatigue, vomiting, diarrhea, stomach pain, respiratory symptoms. Patient states she has been going to work until her boss found out about it today and sent her home. Patient has been drinking fluids but has not had a appetite for solid food. Review of Systems   Review of Systems   Constitutional:  Positive for appetite change and fever. Negative for chills. HENT:  Negative for ear pain and sore throat. Eyes:  Negative for pain and visual disturbance. Respiratory:  Negative for cough and shortness of breath. Cardiovascular:  Negative for chest pain and palpitations. Gastrointestinal:  Positive for nausea. Negative for abdominal pain, diarrhea and vomiting. Genitourinary:  Negative for dysuria and hematuria. Musculoskeletal:  Negative for arthralgias and back pain. Skin:  Negative for color change and rash. Neurological:  Negative for seizures and syncope.    All other systems reviewed and are negative. Current Medications       Current Outpatient Medications:     Levonorgestrel (Mirena, 52 MG,) 20 MCG/DAY IUD, , Disp: , Rfl:     ondansetron (ZOFRAN) 4 mg tablet, Take 1-2 tablets (4-8 mg total) by mouth every 8 (eight) hours as needed for nausea or vomiting, Disp: 20 tablet, Rfl: 0    Current Allergies     Allergies as of 10/11/2023 - Reviewed 10/11/2023   Allergen Reaction Noted    Chocolate - food allergy Hives 07/22/2019            The following portions of the patient's history were reviewed and updated as appropriate: allergies, current medications, past family history, past medical history, past social history, past surgical history and problem list.     Past Medical History:   Diagnosis Date    Abdominal pain     diarrhea, constipstion-    Contact lens/glasses fitting     Family history of Crohn's disease     Father    IUD (intrauterine device) in place     mirena    Ovarian cyst     PCOS       Past Surgical History:   Procedure Laterality Date    [place] LAPS ABD PRTM&OMENTUM DX W/WO SPEC BR/WA SPX N/A 10/20/2020    Procedure: DIAGNOSTIC LAPAROSCOPY,  CYSTOSCOPY;  Surgeon: Elmer Eisenmenger, MD;  Location: [facility];  Service: Gynecology       Family History   Problem Relation Age of Onset    Other Mother         Acid reflux    Allergy (severe) Mother         Sulfa drugs, penicillin      Cervical cancer Mother     Fibromyalgia Mother     Lupus Mother     Rheum arthritis Mother     Thyroid nodules Mother     Other Father         Acid reflux    Asthma Father     COPD Father     Crohn's disease Father     Lactose intolerance Father     Allergies Father         Seasonal          Medications have been verified. Objective   Pulse 78   Temp 98.9 °F (37.2 °C)   Resp 12   Wt 89.8 kg (198 lb)   LMP 04/12/2023 (Exact Date)   SpO2 97%   BMI 32.95 kg/m²        Physical Exam     Physical Exam  Constitutional:       General: She is not in acute distress. Appearance: Normal appearance.  She is not ill-appearing. HENT:      Head: Normocephalic. Nose: Nose normal.      Mouth/Throat:      Mouth: Mucous membranes are moist.      Pharynx: Oropharynx is clear. Eyes:      Conjunctiva/sclera: Conjunctivae normal.      Pupils: Pupils are equal, round, and reactive to light. Cardiovascular:      Rate and Rhythm: Normal rate and regular rhythm. Heart sounds: Normal heart sounds. Pulmonary:      Effort: Pulmonary effort is normal.      Breath sounds: Normal breath sounds. Abdominal:      Tenderness: There is no abdominal tenderness. There is no guarding or rebound. Musculoskeletal:         General: Normal range of motion. Cervical back: Normal range of motion. Skin:     General: Skin is warm and dry. Capillary Refill: Capillary refill takes less than 2 seconds. Neurological:      Mental Status: She is alert and oriented to person, place, and time.    Psychiatric:         Mood and Affect: Mood normal.         Behavior: Behavior normal.

## 2023-10-11 NOTE — LETTER
October 11, 2023     Patient: Joanne Calles   YOB: 2000   Date of Visit: 10/11/2023       To Whom It May Concern: It is my medical opinion that Joanne Calles should be excused for her absence from work through 10/12/2023. She may return on 10/13/2023 as long as she does not have a fever. .    If you have any questions or concerns, please don't hesitate to call.          Sincerely,        Leslee Ridley PA-C    CC: No Recipients

## 2023-10-11 NOTE — PATIENT INSTRUCTIONS
1.  Increase oral fluids. 2.  Observe a liquid diet until your symptoms resolves. 3.  When you begin to advance your diet, begin with the brat diet as discussed. 4.  Go to the ER immediately for any significantly worsening symptoms. 5.  Follow-up with your primary care provider in 5 to 7 days for any persistent symptoms.

## 2023-12-13 ENCOUNTER — OFFICE VISIT (OUTPATIENT)
Dept: URGENT CARE | Facility: CLINIC | Age: 23
End: 2023-12-13
Payer: COMMERCIAL

## 2023-12-13 VITALS
OXYGEN SATURATION: 99 % | RESPIRATION RATE: 20 BRPM | TEMPERATURE: 98.4 F | DIASTOLIC BLOOD PRESSURE: 59 MMHG | SYSTOLIC BLOOD PRESSURE: 112 MMHG | HEART RATE: 85 BPM

## 2023-12-13 DIAGNOSIS — H66.91 RIGHT OTITIS MEDIA, UNSPECIFIED OTITIS MEDIA TYPE: ICD-10-CM

## 2023-12-13 DIAGNOSIS — J01.00 ACUTE MAXILLARY SINUSITIS, RECURRENCE NOT SPECIFIED: Primary | ICD-10-CM

## 2023-12-13 PROCEDURE — 99213 OFFICE O/P EST LOW 20 MIN: CPT | Performed by: PHYSICIAN ASSISTANT

## 2023-12-13 RX ORDER — AMOXICILLIN AND CLAVULANATE POTASSIUM 875; 125 MG/1; MG/1
1 TABLET, FILM COATED ORAL EVERY 12 HOURS SCHEDULED
Qty: 20 TABLET | Refills: 0 | Status: SHIPPED | OUTPATIENT
Start: 2023-12-13 | End: 2023-12-23

## 2023-12-13 RX ORDER — PREDNISONE 10 MG/1
TABLET ORAL
Qty: 20 TABLET | Refills: 0 | Status: SHIPPED | OUTPATIENT
Start: 2023-12-13

## 2023-12-13 NOTE — PROGRESS NOTES
[facility]        NAME: Heidy Justice is a 26 y.o. female  : 2000    MRN: [id number]  DATE: 2023  TIME: 5:12 PM    /59   Pulse 85   Temp 98.4 °F (36.9 °C) (Temporal)   Resp 20   SpO2 99%     Assessment and Plan   Acute maxillary sinusitis, recurrence not specified [J01.00]  1. Acute maxillary sinusitis, recurrence not specified  amoxicillin-clavulanate (AUGMENTIN) 875-125 mg per tablet    predniSONE 10 mg tablet      2. Right otitis media, unspecified otitis media type  amoxicillin-clavulanate (AUGMENTIN) 875-125 mg per tablet    predniSONE 10 mg tablet            Patient Instructions       Follow up with PCP in 3-5 days. Proceed to  ER if symptoms worsen. Chief Complaint     Chief Complaint   Patient presents with    Ear Problem     Ear congestion, Started 4 days ago. Unable to hear in R ear. Covid neg         History of Present Illness       Pt with nasal pain and pressure , right ear fullness for 3-4 days         Review of Systems   Review of Systems   Constitutional: Negative. HENT:  Positive for congestion, ear pain, sinus pressure and sinus pain. Eyes: Negative. Respiratory: Negative. Cardiovascular: Negative. Gastrointestinal: Negative. Endocrine: Negative. Genitourinary: Negative. Musculoskeletal: Negative. Skin: Negative. Allergic/Immunologic: Negative. Neurological: Negative. Hematological: Negative. Psychiatric/Behavioral: Negative. All other systems reviewed and are negative.         Current Medications       Current Outpatient Medications:     amoxicillin-clavulanate (AUGMENTIN) 875-125 mg per tablet, Take 1 tablet by mouth every 12 (twelve) hours for 10 days, Disp: 20 tablet, Rfl: 0    Levonorgestrel (Mirena, 52 MG,) 20 MCG/DAY IUD, , Disp: , Rfl:     predniSONE 10 mg tablet, 4 tabs po qd x 2 days then 3 tabs po qd x 2 days then 2 tabs po qd x 2 days then 1 tab po qd x 2 days, Disp: 20 tablet, Rfl: 0    ondansetron (ZOFRAN) 4 mg tablet, Take 1-2 tablets (4-8 mg total) by mouth every 8 (eight) hours as needed for nausea or vomiting, Disp: 20 tablet, Rfl: 0    Current Allergies     Allergies as of 12/13/2023 - Reviewed 12/13/2023   Allergen Reaction Noted    Chocolate - food allergy Hives 07/22/2019            The following portions of the patient's history were reviewed and updated as appropriate: allergies, current medications, past family history, past medical history, past social history, past surgical history and problem list.     Past Medical History:   Diagnosis Date    Abdominal pain     diarrhea, constipstion-    Contact lens/glasses fitting     Family history of Crohn's disease     Father    IUD (intrauterine device) in place     mirena    Ovarian cyst     PCOS       Past Surgical History:   Procedure Laterality Date    MI LAPS ABD PRTM&OMENTUM DX W/WO SPEC BR/WA SPX N/A 10/20/2020    Procedure: DIAGNOSTIC LAPAROSCOPY,  CYSTOSCOPY;  Surgeon: Kasi Escoto MD;  Location: [facility];  Service: Gynecology       Family History   Problem Relation Age of Onset    Other Mother         Acid reflux    Allergy (severe) Mother         Sulfa drugs, penicillin      Cervical cancer Mother     Fibromyalgia Mother     Lupus Mother     Rheum arthritis Mother     Thyroid nodules Mother     Other Father         Acid reflux    Asthma Father     COPD Father     Crohn's disease Father     Lactose intolerance Father     Allergies Father         Seasonal          Medications have been verified. Objective   /59   Pulse 85   Temp 98.4 °F (36.9 °C) (Temporal)   Resp 20   SpO2 99%        Physical Exam     Physical Exam  Vitals and nursing note reviewed. Constitutional:       Appearance: Normal appearance. She is normal weight. HENT:      Head: Normocephalic and atraumatic.       Right Ear: Ear canal and external ear normal.      Left Ear: Tympanic membrane, ear canal and external ear normal.      Ears:      Comments: Right tm injected      Nose: Congestion present. Comments: Right frontal and max sinus tender to palp   Eyes:      Extraocular Movements: Extraocular movements intact. Conjunctiva/sclera: Conjunctivae normal.      Pupils: Pupils are equal, round, and reactive to light. Cardiovascular:      Rate and Rhythm: Normal rate and regular rhythm. Pulses: Normal pulses. Heart sounds: Normal heart sounds. Pulmonary:      Effort: Pulmonary effort is normal.      Breath sounds: Normal breath sounds. Abdominal:      General: Abdomen is flat. Musculoskeletal:         General: Normal range of motion. Cervical back: Normal range of motion and neck supple. Skin:     General: Skin is warm. Neurological:      Mental Status: She is alert and oriented to person, place, and time.

## 2024-03-04 ENCOUNTER — OFFICE VISIT (OUTPATIENT)
Dept: URGENT CARE | Facility: CLINIC | Age: 24
End: 2024-03-04
Payer: COMMERCIAL

## 2024-03-04 VITALS
HEART RATE: 87 BPM | RESPIRATION RATE: 16 BRPM | WEIGHT: 161 LBS | OXYGEN SATURATION: 97 % | TEMPERATURE: 98.6 F | DIASTOLIC BLOOD PRESSURE: 74 MMHG | SYSTOLIC BLOOD PRESSURE: 116 MMHG | BODY MASS INDEX: 26.79 KG/M2

## 2024-03-04 DIAGNOSIS — R11.10 VOMITING, UNSPECIFIED VOMITING TYPE, UNSPECIFIED WHETHER NAUSEA PRESENT: Primary | ICD-10-CM

## 2024-03-04 PROCEDURE — 99203 OFFICE O/P NEW LOW 30 MIN: CPT | Performed by: PHYSICIAN ASSISTANT

## 2024-03-04 RX ORDER — ONDANSETRON 4 MG/1
4 TABLET, ORALLY DISINTEGRATING ORAL EVERY 6 HOURS PRN
Qty: 20 TABLET | Refills: 0 | Status: SHIPPED | OUTPATIENT
Start: 2024-03-04

## 2024-03-04 NOTE — PROGRESS NOTES
[facility]        NAME: Niki Alston is a 23 y.o. female  : 2000    MRN: [id number]  DATE: 2024  TIME: 9:36 AM    Assessment and Plan   Vomiting, unspecified vomiting type, unspecified whether nausea present [R11.10]  1. Vomiting, unspecified vomiting type, unspecified whether nausea present  ondansetron (ZOFRAN-ODT) 4 mg disintegrating tablet          Suspect viral syndrome. Work note provided. Will send zofran. Discussed BRAT diet and staying hydrated. Discussed strict return to care precautions as well as red flag symptoms which should prompt immediate ED referral. Pt verbalized understanding and is in agreement with plan.  Please follow up with your primary care provider within the next week. Please remember that your visit today was with an urgent care provider and should not replace follow up with your primary care provider for chronic medical issues or annual physicals.       Patient Instructions       Follow up with PCP in 3-5 days.  Proceed to  ER if symptoms worsen.    Chief Complaint     Chief Complaint   Patient presents with    Vomiting     Pt presents with vomit since Friday; denies pregnancy r/t no coitus for several months         History of Present Illness       Niki is a 23 year old female with a PMH of chronic diarrhea and PCOS who presents for vomiting x 2 days. Pt states she has been vomiting 3-5 times a day, states the vomit is mostly water mixed with mucus and does not contain food. Has been able to eat breakfast and dinner normally. Pt also endorses upper respiratory symptoms and congestion since last week that she has been taking Mucinex and Tylenol for. Patient has an IUD in place and denies chance of pregnancy. Patient sees her gastroenterologist regularly. Denies sick contacts, suspicious food intake, change in diet. Denies diarrhea, fever, and weakness. Needs a note for work.         Review of Systems   Review of Systems   Constitutional:  Negative for  chills and fever.   HENT:  Positive for congestion and rhinorrhea. Negative for sore throat.    Respiratory:  Negative for cough and shortness of breath.    Cardiovascular:  Negative for chest pain and palpitations.   Gastrointestinal:  Positive for abdominal pain (generalized discomfort) and vomiting. Negative for diarrhea.   Neurological:  Negative for dizziness and weakness.         Current Medications       Current Outpatient Medications:     Levonorgestrel (Mirena, 52 MG,) 20 MCG/DAY IUD, , Disp: , Rfl:     ondansetron (ZOFRAN-ODT) 4 mg disintegrating tablet, Take 1 tablet (4 mg total) by mouth every 6 (six) hours as needed for nausea or vomiting, Disp: 20 tablet, Rfl: 0    ondansetron (ZOFRAN) 4 mg tablet, Take 1-2 tablets (4-8 mg total) by mouth every 8 (eight) hours as needed for nausea or vomiting, Disp: 20 tablet, Rfl: 0    predniSONE 10 mg tablet, 4 tabs po qd x 2 days then 3 tabs po qd x 2 days then 2 tabs po qd x 2 days then 1 tab po qd x 2 days (Patient not taking: Reported on 3/4/2024), Disp: 20 tablet, Rfl: 0    Current Allergies     Allergies as of 03/04/2024 - Reviewed 03/04/2024   Allergen Reaction Noted    Chocolate - food allergy Hives 07/22/2019            The following portions of the patient's history were reviewed and updated as appropriate: allergies, current medications, past family history, past medical history, past social history, past surgical history and problem list.     Past Medical History:   Diagnosis Date    Abdominal pain     diarrhea, constipstion-    Contact lens/glasses fitting     Family history of Crohn's disease     Father    IUD (intrauterine device) in place     mirena    Ovarian cyst     PCOS       Past Surgical History:   Procedure Laterality Date    WA LAPS ABD PRTM&OMENTUM DX W/WO SPEC BR/WA SPX N/A 10/20/2020    Procedure: DIAGNOSTIC LAPAROSCOPY,  CYSTOSCOPY;  Surgeon: Hans Carranza MD;  Location: [facility];  Service: Gynecology       Family History   Problem  Relation Age of Onset    Other Mother         Acid reflux    Allergy (severe) Mother         Sulfa drugs, penicillin      Cervical cancer Mother     Fibromyalgia Mother     Lupus Mother     Rheum arthritis Mother     Thyroid nodules Mother     Other Father         Acid reflux    Asthma Father     COPD Father     Crohn's disease Father     Lactose intolerance Father     Allergies Father         Seasonal          Medications have been verified.        Objective   /74   Pulse 87   Temp 98.6 °F (37 °C)   Resp 16   Wt 73 kg (161 lb)   LMP  (LMP Unknown)   SpO2 97%   BMI 26.79 kg/m²        Physical Exam     Physical Exam  Vitals and nursing note reviewed.   Constitutional:       General: She is not in acute distress.     Appearance: Normal appearance. She is not ill-appearing.   HENT:      Head: Normocephalic and atraumatic.      Right Ear: Tympanic membrane, ear canal and external ear normal.      Left Ear: Tympanic membrane, ear canal and external ear normal.      Mouth/Throat:      Mouth: Mucous membranes are moist.      Pharynx: No posterior oropharyngeal erythema.   Cardiovascular:      Rate and Rhythm: Normal rate and regular rhythm.      Heart sounds: Normal heart sounds. No murmur heard.  Pulmonary:      Effort: Pulmonary effort is normal. No respiratory distress.      Breath sounds: Normal breath sounds. No wheezing.   Abdominal:      General: Abdomen is flat. Bowel sounds are normal. There is no distension.      Palpations: Abdomen is soft.      Tenderness: There is abdominal tenderness (diffuse mild tenderness in all quadrants). There is no guarding or rebound.   Skin:     General: Skin is warm and dry.      Capillary Refill: Capillary refill takes less than 2 seconds.   Neurological:      Mental Status: She is alert and oriented to person, place, and time.   Psychiatric:         Behavior: Behavior normal.

## 2024-03-04 NOTE — LETTER
March 4, 2024     Patient: Niki Alston   YOB: 2000   Date of Visit: 3/4/2024       To Whom it May Concern:    Niki Alston was seen in my clinic on 3/4/2024. She may return to work on 3/5/2024 .    If you have any questions or concerns, please don't hesitate to call.         Sincerely,          Gemma Lira PA-C        CC: No Recipients

## 2024-03-13 ENCOUNTER — OFFICE VISIT (OUTPATIENT)
Dept: FAMILY MEDICINE CLINIC | Facility: CLINIC | Age: 24
End: 2024-03-13
Payer: COMMERCIAL

## 2024-03-13 VITALS
TEMPERATURE: 98 F | HEIGHT: 65 IN | RESPIRATION RATE: 18 BRPM | HEART RATE: 87 BPM | WEIGHT: 168.6 LBS | BODY MASS INDEX: 28.09 KG/M2 | DIASTOLIC BLOOD PRESSURE: 80 MMHG | SYSTOLIC BLOOD PRESSURE: 114 MMHG

## 2024-03-13 DIAGNOSIS — Z13.1 SCREENING FOR DIABETES MELLITUS (DM): ICD-10-CM

## 2024-03-13 DIAGNOSIS — R10.84 ABDOMINAL PAIN, GENERALIZED: Primary | ICD-10-CM

## 2024-03-13 DIAGNOSIS — R63.4 ABNORMAL WEIGHT LOSS: ICD-10-CM

## 2024-03-13 PROBLEM — E28.2 PCOS (POLYCYSTIC OVARIAN SYNDROME): Status: ACTIVE | Noted: 2024-03-13

## 2024-03-13 PROBLEM — N92.6 IRREGULAR PERIODS: Status: ACTIVE | Noted: 2024-03-13

## 2024-03-13 PROCEDURE — 99214 OFFICE O/P EST MOD 30 MIN: CPT | Performed by: FAMILY MEDICINE

## 2024-03-13 NOTE — PROGRESS NOTES
Assessment/Plan:    No problem-specific Assessment & Plan notes found for this encounter.    Abdominal pain, wt loss, vomiting  Check labs  May need to f/u with GI who she is established with already  Concern for crohns due to fam hx but has had colonoscopies  Mother has lupus and hypothyroidism       Diagnoses and all orders for this visit:    Abdominal pain, generalized  -     CBC and differential; Future  -     TSH, 3rd generation; Future  -     Amylase; Future  -     Lipase; Future  -     EDITH Screen w/ Reflex to Titer/Pattern; Future  -     Anti-microsomal antibody; Future  -     TIBC Panel (incl. Iron, TIBC, % Iron Saturation); Future  -     US abdomen complete; Future    Screening for diabetes mellitus (DM)  -     Comprehensive metabolic panel; Future    Abnormal weight loss  -     CBC and differential; Future  -     TSH, 3rd generation; Future  -     Amylase; Future  -     Lipase; Future  -     EDITH Screen w/ Reflex to Titer/Pattern; Future  -     Anti-microsomal antibody; Future  -     TIBC Panel (incl. Iron, TIBC, % Iron Saturation); Future  -     US abdomen complete; Future              Return in about 1 month (around 4/13/2024) for Recheck.    Subjective:      Patient ID: Niki Alston is a 23 y.o. female.    Chief Complaint   Patient presents with    stomach pains     Sxs since August  , Pt states she has lost 80 since August   Vomiting every other day, with  mucus      HK CMA        Abdominal Pain  This is a recurrent problem. The current episode started more than 1 year ago. The onset quality is gradual. The problem occurs constantly. The most recent episode lasted 7 days. The problem has been waxing and waning since onset. The pain is located in the LLQ, RLQ and generalized abdominal region. The pain is at a severity of 8/10. The quality of the pain is described as cramping, sharp and tearing. Associated symptoms include anorexia, arthralgias, diarrhea, frequency, headaches, nausea and vomiting.  "Pertinent negatives include no belching, constipation, dysuria, fever, flatus, hematochezia, hematuria, melena or myalgias. The symptoms are relieved by liquids.     30# loss in 6m  Not trying to lose  No blood in stool  Has n/v  On zofran currently  No appetite  Postprandial pain noted  No EGD  Still has gb and appendix  Has pcos  No rashes noted  Fam hx of crohns    Mom has lupus    The following portions of the patient's history were reviewed and updated as appropriate: allergies, current medications, past family history, past medical history, past social history, past surgical history and problem list.    Review of Systems   Constitutional:  Negative for fever.   Gastrointestinal:  Positive for abdominal pain, anorexia, diarrhea, nausea and vomiting. Negative for constipation, flatus, hematochezia and melena.   Genitourinary:  Positive for frequency. Negative for dysuria and hematuria.   Musculoskeletal:  Positive for arthralgias. Negative for myalgias.   Neurological:  Positive for headaches.         Current Outpatient Medications   Medication Sig Dispense Refill    Levonorgestrel (Mirena, 52 MG,) 20 MCG/DAY IUD       ondansetron (ZOFRAN-ODT) 4 mg disintegrating tablet Take 1 tablet (4 mg total) by mouth every 6 (six) hours as needed for nausea or vomiting 20 tablet 0     No current facility-administered medications for this visit.       Objective:    /80   Pulse 87   Temp 98 °F (36.7 °C)   Resp 18   Ht 5' 5\" (1.651 m)   Wt 76.5 kg (168 lb 9.6 oz)   LMP  (LMP Unknown)   BMI 28.06 kg/m²        Physical Exam  Vitals and nursing note reviewed.   Constitutional:       General: She is not in acute distress.     Appearance: She is well-developed. She is not ill-appearing.   HENT:      Head: Normocephalic.      Right Ear: Tympanic membrane normal.      Left Ear: Tympanic membrane normal.      Nose: No congestion.      Mouth/Throat:      Pharynx: No oropharyngeal exudate.   Eyes:      General: No scleral " icterus.     Conjunctiva/sclera: Conjunctivae normal.   Cardiovascular:      Rate and Rhythm: Normal rate and regular rhythm.      Heart sounds: No murmur heard.  Pulmonary:      Effort: Pulmonary effort is normal. No respiratory distress.      Breath sounds: No wheezing or rales.   Abdominal:      General: Bowel sounds are normal. There is no distension.      Palpations: Abdomen is soft. There is no mass.      Tenderness: There is abdominal tenderness. There is no rebound.   Musculoskeletal:         General: No deformity.      Cervical back: Neck supple.      Right lower leg: No edema.      Left lower leg: No edema.   Skin:     General: Skin is warm and dry.      Coloration: Skin is not pale.      Findings: No rash.   Neurological:      Mental Status: She is alert.      Motor: No weakness.      Gait: Gait normal.   Psychiatric:         Mood and Affect: Mood normal.         Behavior: Behavior normal.         Thought Content: Thought content normal.                Tyrone Alfonso DO    Answers submitted by the patient for this visit:  Abdominal Pain Questionnaire (Submitted on 3/11/2024)  Chief Complaint: Abdominal pain  Radiates to: back  weight loss: Yes

## 2024-03-20 ENCOUNTER — HOSPITAL ENCOUNTER (OUTPATIENT)
Dept: RADIOLOGY | Facility: HOSPITAL | Age: 24
Discharge: HOME | End: 2024-03-20
Attending: FAMILY MEDICINE
Payer: COMMERCIAL

## 2024-03-20 ENCOUNTER — APPOINTMENT (OUTPATIENT)
Dept: LAB | Facility: HOSPITAL | Age: 24
End: 2024-03-20
Payer: COMMERCIAL

## 2024-03-20 DIAGNOSIS — R63.4 ABNORMAL WEIGHT LOSS: ICD-10-CM

## 2024-03-20 DIAGNOSIS — R10.84 ABDOMINAL PAIN, GENERALIZED: ICD-10-CM

## 2024-03-20 DIAGNOSIS — Z13.1 SCREENING FOR DIABETES MELLITUS (DM): ICD-10-CM

## 2024-03-20 LAB
ALBUMIN SERPL BCP-MCNC: 4.6 G/DL (ref 3.5–5)
ALP SERPL-CCNC: 42 U/L (ref 34–104)
ALT SERPL W P-5'-P-CCNC: 8 U/L (ref 7–52)
AMYLASE SERPL-CCNC: 26 IU/L (ref 29–103)
ANION GAP SERPL CALCULATED.3IONS-SCNC: 7 MMOL/L (ref 4–13)
AST SERPL W P-5'-P-CCNC: 11 U/L (ref 13–39)
BASOPHILS # BLD AUTO: 0.03 THOUSANDS/ÂΜL (ref 0–0.1)
BASOPHILS NFR BLD AUTO: 1 % (ref 0–1)
BILIRUB SERPL-MCNC: 1.2 MG/DL (ref 0.2–1)
BUN SERPL-MCNC: 9 MG/DL (ref 5–25)
CALCIUM SERPL-MCNC: 9.6 MG/DL (ref 8.4–10.2)
CHLORIDE SERPL-SCNC: 105 MMOL/L (ref 96–108)
CO2 SERPL-SCNC: 27 MMOL/L (ref 21–32)
CREAT SERPL-MCNC: 0.74 MG/DL (ref 0.6–1.3)
EOSINOPHIL # BLD AUTO: 0.08 THOUSAND/ÂΜL (ref 0–0.61)
EOSINOPHIL NFR BLD AUTO: 1 % (ref 0–6)
ERYTHROCYTE [DISTWIDTH] IN BLOOD BY AUTOMATED COUNT: 12.4 % (ref 11.6–15.1)
GFR SERPL CREATININE-BSD FRML MDRD: 115 ML/MIN/1.73SQ M
GLUCOSE P FAST SERPL-MCNC: 86 MG/DL (ref 65–99)
HCT VFR BLD AUTO: 35.1 % (ref 34.8–46.1)
HGB BLD-MCNC: 12.1 G/DL (ref 11.5–15.4)
IMM GRANULOCYTES # BLD AUTO: 0.01 THOUSAND/UL (ref 0–0.2)
IMM GRANULOCYTES NFR BLD AUTO: 0 % (ref 0–2)
IRON SATN MFR SERPL: 26 % (ref 15–50)
IRON SERPL-MCNC: 72 UG/DL (ref 50–212)
LIPASE SERPL-CCNC: 11 U/L (ref 11–82)
LYMPHOCYTES # BLD AUTO: 1.87 THOUSANDS/ÂΜL (ref 0.6–4.47)
LYMPHOCYTES NFR BLD AUTO: 33 % (ref 14–44)
MCH RBC QN AUTO: 30.3 PG (ref 26.8–34.3)
MCHC RBC AUTO-ENTMCNC: 34.5 G/DL (ref 31.4–37.4)
MCV RBC AUTO: 88 FL (ref 82–98)
MONOCYTES # BLD AUTO: 0.4 THOUSAND/ÂΜL (ref 0.17–1.22)
MONOCYTES NFR BLD AUTO: 7 % (ref 4–12)
NEUTROPHILS # BLD AUTO: 3.27 THOUSANDS/ÂΜL (ref 1.85–7.62)
NEUTS SEG NFR BLD AUTO: 58 % (ref 43–75)
NRBC BLD AUTO-RTO: 0 /100 WBCS
PLATELET # BLD AUTO: 219 THOUSANDS/UL (ref 149–390)
PMV BLD AUTO: 11.6 FL (ref 8.9–12.7)
POTASSIUM SERPL-SCNC: 3.4 MMOL/L (ref 3.5–5.3)
PROT SERPL-MCNC: 7 G/DL (ref 6.4–8.4)
RBC # BLD AUTO: 4 MILLION/UL (ref 3.81–5.12)
SODIUM SERPL-SCNC: 139 MMOL/L (ref 135–147)
TIBC SERPL-MCNC: 272 UG/DL (ref 250–450)
TSH SERPL DL<=0.05 MIU/L-ACNC: 1.69 UIU/ML (ref 0.45–4.5)
UIBC SERPL-MCNC: 200 UG/DL (ref 155–355)
WBC # BLD AUTO: 5.66 THOUSAND/UL (ref 4.31–10.16)

## 2024-03-20 PROCEDURE — 83690 ASSAY OF LIPASE: CPT

## 2024-03-20 PROCEDURE — 36415 COLL VENOUS BLD VENIPUNCTURE: CPT

## 2024-03-20 PROCEDURE — 76700 US EXAM ABDOM COMPLETE: CPT

## 2024-03-20 PROCEDURE — 83540 ASSAY OF IRON: CPT

## 2024-03-20 PROCEDURE — 84443 ASSAY THYROID STIM HORMONE: CPT

## 2024-03-20 PROCEDURE — 86376 MICROSOMAL ANTIBODY EACH: CPT

## 2024-03-20 PROCEDURE — 86038 ANTINUCLEAR ANTIBODIES: CPT

## 2024-03-20 PROCEDURE — 83550 IRON BINDING TEST: CPT

## 2024-03-20 PROCEDURE — 82150 ASSAY OF AMYLASE: CPT

## 2024-03-20 PROCEDURE — 80053 COMPREHEN METABOLIC PANEL: CPT

## 2024-03-20 PROCEDURE — 85025 COMPLETE CBC W/AUTO DIFF WBC: CPT

## 2024-03-21 LAB — ANA SER QL IA: NEGATIVE

## 2024-03-22 LAB — THYROPEROXIDASE AB SERPL-ACNC: <9 IU/ML (ref 0–34)

## 2024-04-08 ENCOUNTER — OFFICE VISIT (OUTPATIENT)
Dept: FAMILY MEDICINE CLINIC | Facility: CLINIC | Age: 24
End: 2024-04-08
Payer: COMMERCIAL

## 2024-04-08 VITALS
HEART RATE: 98 BPM | BODY MASS INDEX: 27.12 KG/M2 | DIASTOLIC BLOOD PRESSURE: 62 MMHG | SYSTOLIC BLOOD PRESSURE: 110 MMHG | RESPIRATION RATE: 16 BRPM | TEMPERATURE: 99.2 F | WEIGHT: 163 LBS

## 2024-04-08 DIAGNOSIS — R10.84 ABDOMINAL PAIN, GENERALIZED: Primary | ICD-10-CM

## 2024-04-08 DIAGNOSIS — R63.4 ABNORMAL WEIGHT LOSS: ICD-10-CM

## 2024-04-08 DIAGNOSIS — D18.03 LIVER HEMANGIOMA: ICD-10-CM

## 2024-04-08 PROCEDURE — 99214 OFFICE O/P EST MOD 30 MIN: CPT | Performed by: FAMILY MEDICINE

## 2024-04-08 RX ORDER — SUCRALFATE 1 G/1
1 TABLET ORAL 4 TIMES DAILY
Qty: 40 TABLET | Refills: 0 | Status: SHIPPED | OUTPATIENT
Start: 2024-04-08

## 2024-04-08 NOTE — PROGRESS NOTES
Assessment/Plan:    No problem-specific Assessment & Plan notes found for this encounter.    Abnormal weight loss  Did lose 5# in past 4 weeks  Since labs ok, advised to monitor for now    Hypokalemia at 3.4  Could monitor and replace with diet    EDITH and tsh and tpo neg    Abdominal pain, US unrevealing  Trial of carafate r/o gastritis  If symptoms persist or recur, suggest gi eval poss EGD    Liver hemangioma  Advised likely benign     Diagnoses and all orders for this visit:    Abdominal pain, generalized  -     sucralfate (CARAFATE) 1 g tablet; Take 1 tablet (1 g total) by mouth 4 (four) times a day    Abnormal weight loss    Liver hemangioma              Return if symptoms worsen or fail to improve.    Subjective:      Patient ID: Niki Alston is a 24 y.o. female.    Chief Complaint   Patient presents with    Follow-up    Results     Sas/cma       HPI  Some more wt loss  Still luq area pain  Not food related  Crampy vs stabbing  Radiates to back sometimes    The following portions of the patient's history were reviewed and updated as appropriate: allergies, current medications, past family history, past medical history, past social history, past surgical history and problem list.    Review of Systems      Current Outpatient Medications   Medication Sig Dispense Refill    Levonorgestrel (Mirena, 52 MG,) 20 MCG/DAY IUD       ondansetron (ZOFRAN-ODT) 4 mg disintegrating tablet Take 1 tablet (4 mg total) by mouth every 6 (six) hours as needed for nausea or vomiting 20 tablet 0    sucralfate (CARAFATE) 1 g tablet Take 1 tablet (1 g total) by mouth 4 (four) times a day 40 tablet 0     No current facility-administered medications for this visit.       Objective:    /62   Pulse 98   Temp 99.2 °F (37.3 °C)   Resp 16   Wt 73.9 kg (163 lb)   BMI 27.12 kg/m²        Physical Exam           Tyrone Alfonso DO

## 2024-05-01 PROBLEM — Z13.1 SCREENING FOR DIABETES MELLITUS (DM): Status: RESOLVED | Noted: 2024-03-13 | Resolved: 2024-05-01

## 2025-02-01 ENCOUNTER — OFFICE VISIT (OUTPATIENT)
Dept: URGENT CARE | Facility: CLINIC | Age: 25
End: 2025-02-01
Payer: COMMERCIAL

## 2025-02-01 VITALS
WEIGHT: 142.8 LBS | DIASTOLIC BLOOD PRESSURE: 65 MMHG | TEMPERATURE: 98.2 F | HEIGHT: 65 IN | SYSTOLIC BLOOD PRESSURE: 101 MMHG | HEART RATE: 92 BPM | BODY MASS INDEX: 23.79 KG/M2 | OXYGEN SATURATION: 99 % | RESPIRATION RATE: 18 BRPM

## 2025-02-01 DIAGNOSIS — R50.9 FEVER, UNSPECIFIED FEVER CAUSE: Primary | ICD-10-CM

## 2025-02-01 DIAGNOSIS — H65.01 NON-RECURRENT ACUTE SEROUS OTITIS MEDIA OF RIGHT EAR: ICD-10-CM

## 2025-02-01 PROCEDURE — 87636 SARSCOV2 & INF A&B AMP PRB: CPT | Performed by: PHYSICIAN ASSISTANT

## 2025-02-01 PROCEDURE — 99213 OFFICE O/P EST LOW 20 MIN: CPT | Performed by: PHYSICIAN ASSISTANT

## 2025-02-01 RX ORDER — FLUTICASONE PROPIONATE 50 MCG
1 SPRAY, SUSPENSION (ML) NASAL DAILY
Qty: 16 G | Refills: 0 | Status: SHIPPED | OUTPATIENT
Start: 2025-02-01

## 2025-02-01 RX ORDER — BROMPHENIRAMINE MALEATE, PSEUDOEPHEDRINE HYDROCHLORIDE, AND DEXTROMETHORPHAN HYDROBROMIDE 2; 30; 10 MG/5ML; MG/5ML; MG/5ML
10 SYRUP ORAL 4 TIMES DAILY PRN
Qty: 120 ML | Refills: 0 | Status: SHIPPED | OUTPATIENT
Start: 2025-02-01

## 2025-02-01 RX ORDER — PREDNISONE 10 MG/1
30 TABLET ORAL DAILY
Qty: 9 TABLET | Refills: 0 | Status: SHIPPED | OUTPATIENT
Start: 2025-02-01 | End: 2025-02-04

## 2025-02-01 NOTE — PROGRESS NOTES
[facility]        NAME: Niki Alston is a 24 y.o. female  : 2000    MRN: [id number]  DATE: 2025  TIME: 2:37 PM    Assessment and Plan   Fever, unspecified fever cause [R50.9]  1. Fever, unspecified fever cause  Covid/Flu- Office Collect Normal      2. Non-recurrent acute serous otitis media of right ear  predniSONE 10 mg tablet    brompheniramine-pseudoephedrine-DM 30-2-10 MG/5ML syrup    fluticasone (FLONASE) 50 mcg/act nasal spray            Patient Instructions   There are no Patient Instructions on file for this visit.      Follow up with PCP in 3-5 days.  Proceed to  ER if symptoms worsen.    Chief Complaint     Chief Complaint   Patient presents with    Cold Like Symptoms     Congestion in chest and nose, low grade temp of 100.7F, and headache that started yesterday. Taking Mucinex OTC with relief.          History of Present Illness       The patient is a 24-year-old female presenting today for chest congestion, nasal congestion, low-grade temp and headache that started 1 day ago.  Took Mucinex with no relief.  Reports history of ear infections as well as upper respiratory infections.  Works at a school.      Review of Systems   Review of Systems   Constitutional:  Positive for fever. Negative for activity change, appetite change, chills and fatigue.   HENT:  Positive for congestion. Negative for ear pain, rhinorrhea, sinus pressure, sinus pain and sore throat.    Eyes:  Negative for pain and visual disturbance.   Respiratory:  Positive for chest tightness. Negative for cough and shortness of breath.    Cardiovascular:  Negative for chest pain and palpitations.   Gastrointestinal:  Negative for abdominal pain, diarrhea, nausea and vomiting.   Genitourinary:  Negative for dysuria and hematuria.   Musculoskeletal:  Negative for arthralgias, back pain and myalgias.   Skin:  Negative for color change, pallor and rash.   Neurological:  Positive for headaches. Negative for seizures  and syncope.   All other systems reviewed and are negative.        Current Medications       Current Outpatient Medications:     brompheniramine-pseudoephedrine-DM 30-2-10 MG/5ML syrup, Take 10 mL by mouth 4 (four) times a day as needed for congestion or cough, Disp: 120 mL, Rfl: 0    fluticasone (FLONASE) 50 mcg/act nasal spray, 1 spray into each nostril daily, Disp: 16 g, Rfl: 0    Levonorgestrel (Mirena, 52 MG,) 20 MCG/DAY IUD, , Disp: , Rfl:     ondansetron (ZOFRAN-ODT) 4 mg disintegrating tablet, Take 1 tablet (4 mg total) by mouth every 6 (six) hours as needed for nausea or vomiting, Disp: 20 tablet, Rfl: 0    predniSONE 10 mg tablet, Take 3 tablets (30 mg total) by mouth daily for 3 days, Disp: 9 tablet, Rfl: 0    sucralfate (CARAFATE) 1 g tablet, Take 1 tablet (1 g total) by mouth 4 (four) times a day (Patient not taking: Reported on 2/1/2025), Disp: 40 tablet, Rfl: 0    Current Allergies     Allergies as of 02/01/2025 - Reviewed 02/01/2025   Allergen Reaction Noted    Chocolate - food allergy Hives 07/22/2019            The following portions of the patient's history were reviewed and updated as appropriate: allergies, current medications, past family history, past medical history, past social history, past surgical history and problem list.     Past Medical History:   Diagnosis Date    Abdominal pain     diarrhea, constipstion-    Contact lens/glasses fitting     Family history of Crohn's disease     Father    IUD (intrauterine device) in place     mirena    Ovarian cyst     PCOS       Past Surgical History:   Procedure Laterality Date    IL LAPS ABD PRTM&OMENTUM DX W/WO SPEC BR/WA SPX N/A 10/20/2020    Procedure: DIAGNOSTIC LAPAROSCOPY,  CYSTOSCOPY;  Surgeon: Hans Carranza MD;  Location: [facility];  Service: Gynecology       Family History   Problem Relation Age of Onset    Other Mother         Acid reflux    Allergy (severe) Mother         Sulfa drugs, penicillin      Cervical cancer Mother      "Fibromyalgia Mother     Lupus Mother     Rheum arthritis Mother     Thyroid nodules Mother     Other Father         Acid reflux    Asthma Father     COPD Father     Crohn's disease Father     Lactose intolerance Father     Allergies Father         Seasonal          Medications have been verified.        Objective   /65 (BP Location: Left arm, Patient Position: Sitting, Cuff Size: Adult)   Pulse 92   Temp 98.2 °F (36.8 °C) (Temporal)   Resp 18   Ht 5' 5\" (1.651 m)   Wt 64.8 kg (142 lb 12.8 oz)   SpO2 99%   BMI 23.76 kg/m²        Physical Exam     Physical Exam  Vitals reviewed.   Constitutional:       General: She is not in acute distress.     Appearance: Normal appearance. She is well-developed and normal weight. She is not ill-appearing, toxic-appearing or diaphoretic.   HENT:      Head: Normocephalic and atraumatic.      Right Ear: Ear canal and external ear normal. No drainage, swelling or tenderness. No middle ear effusion. There is no impacted cerumen. Tympanic membrane is not erythematous.      Left Ear: Tympanic membrane, ear canal and external ear normal. No drainage, swelling or tenderness.  No middle ear effusion. There is no impacted cerumen. Tympanic membrane is not erythematous.      Ears:      Comments: Fluid behind right TM     Nose: Nose normal. No congestion or rhinorrhea.      Mouth/Throat:      Mouth: Mucous membranes are moist. No oral lesions.      Pharynx: Oropharynx is clear. Uvula midline. No pharyngeal swelling, oropharyngeal exudate, posterior oropharyngeal erythema or uvula swelling.      Tonsils: No tonsillar exudate or tonsillar abscesses. 0 on the right. 0 on the left.   Eyes:      Extraocular Movements:      Right eye: Normal extraocular motion.      Left eye: Normal extraocular motion.      Conjunctiva/sclera: Conjunctivae normal.      Pupils: Pupils are equal, round, and reactive to light.   Cardiovascular:      Rate and Rhythm: Normal rate and regular rhythm.      Heart " sounds: Normal heart sounds. No murmur heard.     No friction rub. No gallop.   Pulmonary:      Effort: Pulmonary effort is normal. No respiratory distress.      Breath sounds: Normal breath sounds. No stridor. No wheezing, rhonchi or rales.   Chest:      Chest wall: No tenderness.   Abdominal:      General: Abdomen is flat. Bowel sounds are normal. There is no distension.      Palpations: Abdomen is soft.      Tenderness: There is no abdominal tenderness. There is no guarding.   Musculoskeletal:         General: Normal range of motion.   Skin:     General: Skin is warm and dry.      Capillary Refill: Capillary refill takes less than 2 seconds.   Neurological:      Mental Status: She is alert.

## 2025-02-03 LAB
FLUAV RNA RESP QL NAA+PROBE: POSITIVE
FLUBV RNA RESP QL NAA+PROBE: NEGATIVE
SARS-COV-2 RNA RESP QL NAA+PROBE: NEGATIVE

## 2025-04-07 ENCOUNTER — OFFICE VISIT (OUTPATIENT)
Dept: URGENT CARE | Facility: CLINIC | Age: 25
End: 2025-04-07
Payer: COMMERCIAL

## 2025-04-07 VITALS
HEIGHT: 65 IN | SYSTOLIC BLOOD PRESSURE: 101 MMHG | WEIGHT: 147.4 LBS | BODY MASS INDEX: 24.56 KG/M2 | TEMPERATURE: 97.9 F | HEART RATE: 78 BPM | OXYGEN SATURATION: 98 % | RESPIRATION RATE: 17 BRPM | DIASTOLIC BLOOD PRESSURE: 55 MMHG

## 2025-04-07 DIAGNOSIS — H66.93 BILATERAL OTITIS MEDIA, UNSPECIFIED OTITIS MEDIA TYPE: ICD-10-CM

## 2025-04-07 DIAGNOSIS — H10.32 ACUTE CONJUNCTIVITIS OF LEFT EYE, UNSPECIFIED ACUTE CONJUNCTIVITIS TYPE: Primary | ICD-10-CM

## 2025-04-07 PROCEDURE — 99213 OFFICE O/P EST LOW 20 MIN: CPT | Performed by: PHYSICIAN ASSISTANT

## 2025-04-07 RX ORDER — OFLOXACIN 3 MG/ML
1 SOLUTION/ DROPS OPHTHALMIC 4 TIMES DAILY
Qty: 5 ML | Refills: 0 | Status: SHIPPED | OUTPATIENT
Start: 2025-04-07 | End: 2025-04-14

## 2025-04-07 RX ORDER — PREDNISONE 10 MG/1
30 TABLET ORAL DAILY
Qty: 9 TABLET | Refills: 0 | Status: SHIPPED | OUTPATIENT
Start: 2025-04-07 | End: 2025-04-10

## 2025-04-07 NOTE — PROGRESS NOTES
"  [facility]        NAME: Niki Alston is a 25 y.o. female  : 2000    MRN: [id number]  DATE: 2025  TIME: 10:39 AM    Assessment and Plan   Acute conjunctivitis of left eye, unspecified acute conjunctivitis type [H10.32]  1. Acute conjunctivitis of left eye, unspecified acute conjunctivitis type  ofloxacin (OCUFLOX) 0.3 % ophthalmic solution      2. Bilateral otitis media, unspecified otitis media type  amoxicillin-clavulanate (AUGMENTIN) 875-125 mg per tablet    predniSONE 10 mg tablet        Work note provided.    Patient Instructions     Patient Instructions   Patient Education     Ear infections in adults   The Basics   Written by the doctors and editors at [facility]   What is an ear infection? -- An ear infection is a condition that can cause pain in the ear, fever, and trouble hearing. Ear infections are more common in children than in adults.  Ear infections often occur after a cold or problem with seasonal allergies. Ear infections in adults happen more often in people who have a problem with their Eustachian tubes. The Eustachian tube connects the middle ear (the part of the ear behind the eardrum) to the back of the nose and throat.  Fluid can build up in the middle part of the ear behind the eardrum. This fluid can become infected and press on the eardrum, causing it to bulge (figure 1). This causes symptoms.  The medical term for middle ear infections is \"otitis media.\"  What are the symptoms of an ear infection? -- In adults, the symptoms include:   Ear pain   Temporary hearing loss   Feeling dizzy  How do I know if I have an ear infection? -- If you think that you have an ear infection, see a doctor or nurse. They will ask you about your symptoms, do an exam, and look in your ears.  How is an ear infection treated? -- Doctors treat ear infections in adults with antibiotics. These medicines kill the bacteria that cause some ear infections. Even though some ear infections are " caused by a virus, doctors often prescribe antibiotics for adults anyway. That's because ear infections can lead to other problems if they are not treated quickly.  Is there anything I can do on my own to feel better?    Take all of your medicines as instructed. If the doctor prescribes antibiotics, finish all of them, even if you start to feel better.   You can take non-prescription medicines to relieve pain. Examples include acetaminophen (sample brand name: Tylenol), ibuprofen (sample brand names: Advil, Motrin), or naproxen (sample brand name: Aleve).   You can try ice or heat to help with ear pain. Do not sleep with ice or heat on your ear.   Put a cold gel pack, bag of ice, or bag of frozen vegetables on the ear every 1 to 2 hours, for 15 minutes each time. Put a thin towel between the ice (or other cold object) and the skin.   Put a heating pad, warm towel, or hot water bottle on the ear every 1 to 2 hours, for 15 minutes at a time. Put a thin towel between the warm object and the skin.   Do not put anything in your ear unless the doctor or nurse told you to.   Airplane travel can make ear pain worse, especially as the plane starts to land. Chewing gum, drinking, or eating food might help.  Can ear infections be prevented? -- To lower your risk of getting an ear infection:   If you smoke, try to stop. Avoid places where others are smoking.   Wash your hands often.   Stay away from others who are sick with a cold or viral infection.   Get all of the vaccines your doctor recommends.  When should I call the doctor? -- Call for advice if:   Your symptoms are not getting better in 2 to 3 days.   You continue to have hearing problems after 2 to 3 weeks.   You have a fever of 100.4°F (38°C) or higher, or chills.   You have discharge or drainage coming from your ear.  All topics are updated as new evidence becomes available and our peer review process is complete.  This topic retrieved from [employer] on: May 15,  "2024.  Topic 549213 Version 1.0  Release: 32.4.3 - C32.134  © 2024 UpToDate, Inc. and/or its affiliates. All rights reserved.  figure 1: Ear infection (otitis media)     The ear on the left is normal and does not have an infection. The ear on the right shows what an infection can look like. The infected fluid in the middle ear causes the eardrum to bulge. Normally, fluid in the middle ear drains into the throat through a tube called the \"Eustachian tube.\" But during an infection, swelling blocks off the tube, so fluid builds up.  Graphic 69843 Version 8.0  Consumer Information Use and Disclaimer   Disclaimer: This generalized information is a limited summary of diagnosis, treatment, and/or medication information. It is not meant to be comprehensive and should be used as a tool to help the user understand and/or assess potential diagnostic and treatment options. It does NOT include all information about conditions, treatments, medications, side effects, or risks that may apply to a specific patient. It is not intended to be medical advice or a substitute for the medical advice, diagnosis, or treatment of a health care provider based on the health care provider's examination and assessment of a patient's specific and unique circumstances. Patients must speak with a health care provider for complete information about their health, medical questions, and treatment options, including any risks or benefits regarding use of medications. This information does not endorse any treatments or medications as safe, effective, or approved for treating a specific patient. UpToDate, Inc. and its affiliates disclaim any warranty or liability relating to this information or the use thereof.The use of this information is governed by the Terms of Use, available at https://www.woltersAzendoouwer.com/en/know/clinical-effectiveness-terms. 2024© UpToDate, Inc. and its affiliates and/or licensors. All rights reserved.  Copyright   © 2024 " "UpToDate, Inc. and/or its affiliates. All rights reserved.      Patient Education     Conjunctivitis (pink eye)   The Basics   Written by the doctors and editors at [employer]   What is pink eye? -- Pink eye is a term people use to describe an infection or irritation of the eye. The medical term for pink eye is \"conjunctivitis.\"  If you have pink eye, your eye (or eyes) might:   Turn pink or red   Weep or ooze a gooey liquid   Become itchy or burn   Get stuck shut, especially when you first wake up  Pink eye can be caused by an infection, allergies, or an unknown irritation.  Can you catch pink eye from someone else? -- Yes. When pink eye is caused by an infection, it can spread easily. Usually, people catch it from touching something that has been in contact with an infected person's eye. It can also be spread when an infected person touches someone else, and then that person touches their eye.  If someone you know has pink eye, avoid touching their pillowcases, towels, or other personal items.  When should I see a doctor or nurse? -- See your doctor or nurse if your eye hurts, or if you still have trouble seeing clearly after blinking. If you do not have these problems, but think you might have pink eye, your doctor or nurse might be able to give you advice over the phone.  Can pink eye be treated? -- Most cases of pink eye go away on their own without treatment. But some types of pink eye can be treated.  When pink eye is caused by infection, it is usually caused by a virus, so antibiotics will not help. Still, pink eye caused by a virus can last several days.   Pink eye caused by an infection with bacteria can be treated with antibiotic eye drops, gel, or ointment.   Pink eye caused by other problems can be treated with eye drops normally used to treat allergies. These drops will not cure the pink eye, but they can help with itchiness and irritation.  When using eye drops for infection, do not touch your healthy " "eye after touching your infected eye. Also, do not touch the bottle or dropper directly onto 1 eye and then use it in the other. These things can cause the infection to spread from 1 eye to the other.  If your eyelids feel swollen, it might also help to hold a cool wet cloth on the area.  What if I wear contact lenses? -- If you wear contact lenses and you have symptoms of pink eye, it is really important to have a doctor look at your eyes. In people who wear contacts, the symptoms of pink eye can be caused by \"corneal abrasion.\" Corneal abrasion is a scratch on the eye and can be a serious problem.  During treatment for eye infections, you might need to stop wearing your contacts for a short time. If your contacts are disposable, throw them away and use new ones. If your contacts are not disposable, you need to carefully clean them. You should also throw away your contact lens case and get a new one.  When can I go back to work or school? -- If you have pink eye caused by an infection, remember that it can spread very easily. The best way to avoid spreading it is to stay away from other people until you no longer have symptoms. If this is not possible, wash your hands often (figure 1). It's also important to avoid touching your eyes and sharing items that could spread the infection.  Schools and day cares usually have rules about when a child with pink eye can return. If a child has a bacterial infection, they will probably need to stay home until they have gotten antibiotic eye drops or ointment for 24 hours.  Can pink eye be prevented? -- To keep from getting or spreading pink eye caused by an infection:   Wash your hands often with soap and water.   Try not to touch your eyes.   Avoid sharing towels, bedding, or other personal items with a person who has pink eye.  If your pink eye is caused by allergies, it might help to stay inside with the windows shut as much as possible during peak allergy seasons.  What " problems should I watch for? -- Call your doctor or nurse if:   You have trouble seeing clearly after blinking.   Your eye is still red or has drainage after 3 days.   You have eye pain that is getting worse.  All topics are updated as new evidence becomes available and our peer review process is complete.  This topic retrieved from [employer] on: Feb 28, 2024.  Topic 84871 Version 20.0  Release: 32.2.4 - C32.58  © 2024 UpToDate, Inc. and/or its affiliates. All rights reserved.  figure 1: How to wash your hands     Wet your hands with clean water, and apply a small amount of soap. Lather and rub hands together for at least 20 seconds. Clean your wrists, palms, backs of your hands, between your fingers, tips of your fingers, thumbs, and under and around your nails. Rinse well, and dry your hands using a clean towel.  Graphic 608950 Version 7.0  Consumer Information Use and Disclaimer   Disclaimer: This generalized information is a limited summary of diagnosis, treatment, and/or medication information. It is not meant to be comprehensive and should be used as a tool to help the user understand and/or assess potential diagnostic and treatment options. It does NOT include all information about conditions, treatments, medications, side effects, or risks that may apply to a specific patient. It is not intended to be medical advice or a substitute for the medical advice, diagnosis, or treatment of a health care provider based on the health care provider's examination and assessment of a patient's specific and unique circumstances. Patients must speak with a health care provider for complete information about their health, medical questions, and treatment options, including any risks or benefits regarding use of medications. This information does not endorse any treatments or medications as safe, effective, or approved for treating a specific patient. UpToDate, Inc. and its affiliates disclaim any warranty or liability  relating to this information or the use thereof.The use of this information is governed by the Terms of Use, available at https://www.wolterskluwer.com/en/know/clinical-effectiveness-terms. 2024© UpToDate, Inc. and its affiliates and/or licensors. All rights reserved.  Copyright   © 2024 UpToDate, Inc. and/or its affiliates. All rights reserved.        Follow up with PCP in 3-5 days.  Proceed to  ER if symptoms worsen.    Chief Complaint     Chief Complaint   Patient presents with    Conjunctivitis     Pink eye (left)- Sunday, ear clogged (both), nasal congestion- started Saturday. OTC- Tylenol, Sudafed          History of Present Illness       The patient is a 25-year-old female presenting today for conjunctivitis of her left eye.  Reports that yesterday she noticed her eye was red and itchy.  The day prior she noticed both ears felt clogged.  She also reports 1 week of nasal congestion that she thought was from allergies.  Works as a .  No known sick contacts.        Review of Systems   Review of Systems   Constitutional:  Negative for activity change, appetite change, chills, fatigue and fever.   HENT:  Positive for congestion and ear pain. Negative for rhinorrhea, sinus pressure, sinus pain and sore throat.    Eyes:  Positive for discharge and redness. Negative for pain and visual disturbance.   Respiratory:  Negative for cough, chest tightness and shortness of breath.    Cardiovascular:  Negative for chest pain and palpitations.   Gastrointestinal:  Negative for abdominal pain, diarrhea, nausea and vomiting.   Genitourinary:  Negative for dysuria and hematuria.   Musculoskeletal:  Negative for arthralgias, back pain and myalgias.   Skin:  Negative for color change, pallor and rash.   Neurological:  Negative for seizures, syncope and headaches.   All other systems reviewed and are negative.        Current Medications       Current Outpatient Medications:     amoxicillin-clavulanate  (AUGMENTIN) 875-125 mg per tablet, Take 1 tablet by mouth every 12 (twelve) hours for 7 days, Disp: 14 tablet, Rfl: 0    Levonorgestrel (Mirena, 52 MG,) 20 MCG/DAY IUD, , Disp: , Rfl:     ofloxacin (OCUFLOX) 0.3 % ophthalmic solution, Administer 1 drop into the left eye 4 (four) times a day for 7 days, Disp: 5 mL, Rfl: 0    predniSONE 10 mg tablet, Take 3 tablets (30 mg total) by mouth daily for 3 days, Disp: 9 tablet, Rfl: 0    brompheniramine-pseudoephedrine-DM 30-2-10 MG/5ML syrup, Take 10 mL by mouth 4 (four) times a day as needed for congestion or cough (Patient not taking: Reported on 4/7/2025), Disp: 120 mL, Rfl: 0    fluticasone (FLONASE) 50 mcg/act nasal spray, 1 spray into each nostril daily (Patient not taking: Reported on 4/7/2025), Disp: 16 g, Rfl: 0    ondansetron (ZOFRAN-ODT) 4 mg disintegrating tablet, Take 1 tablet (4 mg total) by mouth every 6 (six) hours as needed for nausea or vomiting (Patient not taking: Reported on 4/7/2025), Disp: 20 tablet, Rfl: 0    sucralfate (CARAFATE) 1 g tablet, Take 1 tablet (1 g total) by mouth 4 (four) times a day (Patient not taking: Reported on 4/7/2025), Disp: 40 tablet, Rfl: 0    Current Allergies     Allergies as of 04/07/2025 - Reviewed 04/07/2025   Allergen Reaction Noted    Chocolate - food allergy Hives 07/22/2019            The following portions of the patient's history were reviewed and updated as appropriate: allergies, current medications, past family history, past medical history, past social history, past surgical history and problem list.     Past Medical History:   Diagnosis Date    Abdominal pain     diarrhea, constipstion-    Contact lens/glasses fitting     Family history of Crohn's disease     Father    IUD (intrauterine device) in place     mirena    Ovarian cyst     PCOS       Past Surgical History:   Procedure Laterality Date    IN LAPS ABD PRTM&OMENTUM DX W/WO SPEC BR/WA SPX N/A 10/20/2020    Procedure: DIAGNOSTIC LAPAROSCOPY,  CYSTOSCOPY;   "Surgeon: Hans Carranza MD;  Location: [facility];  Service: Gynecology       Family History   Problem Relation Age of Onset    Other Mother         Acid reflux    Allergy (severe) Mother         Sulfa drugs, penicillin      Cervical cancer Mother     Fibromyalgia Mother     Lupus Mother     Rheum arthritis Mother     Thyroid nodules Mother     Other Father         Acid reflux    Asthma Father     COPD Father     Crohn's disease Father     Lactose intolerance Father     Allergies Father         Seasonal          Medications have been verified.        Objective   /55   Pulse 78   Temp 97.9 °F (36.6 °C)   Resp 17   Ht 5' 5\" (1.651 m)   Wt 66.9 kg (147 lb 6.4 oz)   SpO2 98%   BMI 24.53 kg/m²        Physical Exam     Physical Exam  Vitals and nursing note reviewed.   Constitutional:       General: She is not in acute distress.     Appearance: Normal appearance. She is well-developed and normal weight. She is not ill-appearing, toxic-appearing or diaphoretic.   HENT:      Head: Normocephalic and atraumatic.      Right Ear: Ear canal and external ear normal. No drainage, swelling or tenderness. No middle ear effusion. There is no impacted cerumen. Tympanic membrane is not erythematous.      Left Ear: Ear canal and external ear normal. No drainage, swelling or tenderness.  No middle ear effusion. There is no impacted cerumen. Tympanic membrane is not erythematous.      Ears:      Comments: Erythema of both TMs.  Fluid bubbles behind the right TM.     Nose: Congestion present. No rhinorrhea.      Mouth/Throat:      Mouth: Mucous membranes are moist. No oral lesions.      Pharynx: Oropharynx is clear. Uvula midline. No pharyngeal swelling, oropharyngeal exudate, posterior oropharyngeal erythema or uvula swelling.      Tonsils: No tonsillar exudate or tonsillar abscesses. 0 on the right. 0 on the left.   Eyes:      Extraocular Movements:      Right eye: Normal extraocular motion.      Left eye: Normal extraocular " motion.      Conjunctiva/sclera:      Right eye: Right conjunctiva is not injected.      Left eye: Left conjunctiva is injected.      Pupils: Pupils are equal, round, and reactive to light.   Cardiovascular:      Rate and Rhythm: Normal rate and regular rhythm.      Heart sounds: Normal heart sounds. No murmur heard.     No friction rub. No gallop.   Pulmonary:      Effort: Pulmonary effort is normal. No respiratory distress.      Breath sounds: Normal breath sounds. No stridor. No wheezing, rhonchi or rales.   Chest:      Chest wall: No tenderness.   Abdominal:      General: Abdomen is flat. Bowel sounds are normal. There is no distension.      Palpations: Abdomen is soft.      Tenderness: There is no abdominal tenderness. There is no guarding.   Musculoskeletal:         General: Normal range of motion.   Skin:     General: Skin is warm and dry.      Capillary Refill: Capillary refill takes less than 2 seconds.   Neurological:      Mental Status: She is alert.

## 2025-04-07 NOTE — PATIENT INSTRUCTIONS
"Patient Education     Ear infections in adults   The Basics   Written by the doctors and editors at [facility]   What is an ear infection? -- An ear infection is a condition that can cause pain in the ear, fever, and trouble hearing. Ear infections are more common in children than in adults.  Ear infections often occur after a cold or problem with seasonal allergies. Ear infections in adults happen more often in people who have a problem with their Eustachian tubes. The Eustachian tube connects the middle ear (the part of the ear behind the eardrum) to the back of the nose and throat.  Fluid can build up in the middle part of the ear behind the eardrum. This fluid can become infected and press on the eardrum, causing it to bulge (figure 1). This causes symptoms.  The medical term for middle ear infections is \"otitis media.\"  What are the symptoms of an ear infection? -- In adults, the symptoms include:   Ear pain   Temporary hearing loss   Feeling dizzy  How do I know if I have an ear infection? -- If you think that you have an ear infection, see a doctor or nurse. They will ask you about your symptoms, do an exam, and look in your ears.  How is an ear infection treated? -- Doctors treat ear infections in adults with antibiotics. These medicines kill the bacteria that cause some ear infections. Even though some ear infections are caused by a virus, doctors often prescribe antibiotics for adults anyway. That's because ear infections can lead to other problems if they are not treated quickly.  Is there anything I can do on my own to feel better?    Take all of your medicines as instructed. If the doctor prescribes antibiotics, finish all of them, even if you start to feel better.   You can take non-prescription medicines to relieve pain. Examples include acetaminophen (sample brand name: Tylenol), ibuprofen (sample brand names: Advil, Motrin), or naproxen (sample brand name: Aleve).   You can try ice or heat to help " "with ear pain. Do not sleep with ice or heat on your ear.   Put a cold gel pack, bag of ice, or bag of frozen vegetables on the ear every 1 to 2 hours, for 15 minutes each time. Put a thin towel between the ice (or other cold object) and the skin.   Put a heating pad, warm towel, or hot water bottle on the ear every 1 to 2 hours, for 15 minutes at a time. Put a thin towel between the warm object and the skin.   Do not put anything in your ear unless the doctor or nurse told you to.   Airplane travel can make ear pain worse, especially as the plane starts to land. Chewing gum, drinking, or eating food might help.  Can ear infections be prevented? -- To lower your risk of getting an ear infection:   If you smoke, try to stop. Avoid places where others are smoking.   Wash your hands often.   Stay away from others who are sick with a cold or viral infection.   Get all of the vaccines your doctor recommends.  When should I call the doctor? -- Call for advice if:   Your symptoms are not getting better in 2 to 3 days.   You continue to have hearing problems after 2 to 3 weeks.   You have a fever of 100.4°F (38°C) or higher, or chills.   You have discharge or drainage coming from your ear.  All topics are updated as new evidence becomes available and our peer review process is complete.  This topic retrieved from [employer] on: May 15, 2024.  Topic 853819 Version 1.0  Release: 32.4.3 - C32.134  © 2024 UpToDate, Inc. and/or its affiliates. All rights reserved.  figure 1: Ear infection (otitis media)     The ear on the left is normal and does not have an infection. The ear on the right shows what an infection can look like. The infected fluid in the middle ear causes the eardrum to bulge. Normally, fluid in the middle ear drains into the throat through a tube called the \"Eustachian tube.\" But during an infection, swelling blocks off the tube, so fluid builds up.  Graphic 20133 Version 8.0  Consumer Information Use and " "Disclaimer   Disclaimer: This generalized information is a limited summary of diagnosis, treatment, and/or medication information. It is not meant to be comprehensive and should be used as a tool to help the user understand and/or assess potential diagnostic and treatment options. It does NOT include all information about conditions, treatments, medications, side effects, or risks that may apply to a specific patient. It is not intended to be medical advice or a substitute for the medical advice, diagnosis, or treatment of a health care provider based on the health care provider's examination and assessment of a patient's specific and unique circumstances. Patients must speak with a health care provider for complete information about their health, medical questions, and treatment options, including any risks or benefits regarding use of medications. This information does not endorse any treatments or medications as safe, effective, or approved for treating a specific patient. UpToDate, Inc. and its affiliates disclaim any warranty or liability relating to this information or the use thereof.The use of this information is governed by the Terms of Use, available at https://www.[employer].com/en/know/clinical-effectiveness-terms. 2024© UpToDate, Inc. and its affiliates and/or licensors. All rights reserved.  Copyright   © 2024 UpToDate, Inc. and/or its affiliates. All rights reserved.      Patient Education     Conjunctivitis (pink eye)   The Basics   Written by the doctors and editors at [facility]   What is pink eye? -- Pink eye is a term people use to describe an infection or irritation of the eye. The medical term for pink eye is \"conjunctivitis.\"  If you have pink eye, your eye (or eyes) might:   Turn pink or red   Weep or ooze a gooey liquid   Become itchy or burn   Get stuck shut, especially when you first wake up  Pink eye can be caused by an infection, allergies, or an unknown irritation.  Can you catch pink " "eye from someone else? -- Yes. When pink eye is caused by an infection, it can spread easily. Usually, people catch it from touching something that has been in contact with an infected person's eye. It can also be spread when an infected person touches someone else, and then that person touches their eye.  If someone you know has pink eye, avoid touching their pillowcases, towels, or other personal items.  When should I see a doctor or nurse? -- See your doctor or nurse if your eye hurts, or if you still have trouble seeing clearly after blinking. If you do not have these problems, but think you might have pink eye, your doctor or nurse might be able to give you advice over the phone.  Can pink eye be treated? -- Most cases of pink eye go away on their own without treatment. But some types of pink eye can be treated.  When pink eye is caused by infection, it is usually caused by a virus, so antibiotics will not help. Still, pink eye caused by a virus can last several days.   Pink eye caused by an infection with bacteria can be treated with antibiotic eye drops, gel, or ointment.   Pink eye caused by other problems can be treated with eye drops normally used to treat allergies. These drops will not cure the pink eye, but they can help with itchiness and irritation.  When using eye drops for infection, do not touch your healthy eye after touching your infected eye. Also, do not touch the bottle or dropper directly onto 1 eye and then use it in the other. These things can cause the infection to spread from 1 eye to the other.  If your eyelids feel swollen, it might also help to hold a cool wet cloth on the area.  What if I wear contact lenses? -- If you wear contact lenses and you have symptoms of pink eye, it is really important to have a doctor look at your eyes. In people who wear contacts, the symptoms of pink eye can be caused by \"corneal abrasion.\" Corneal abrasion is a scratch on the eye and can be a serious " problem.  During treatment for eye infections, you might need to stop wearing your contacts for a short time. If your contacts are disposable, throw them away and use new ones. If your contacts are not disposable, you need to carefully clean them. You should also throw away your contact lens case and get a new one.  When can I go back to work or school? -- If you have pink eye caused by an infection, remember that it can spread very easily. The best way to avoid spreading it is to stay away from other people until you no longer have symptoms. If this is not possible, wash your hands often (figure 1). It's also important to avoid touching your eyes and sharing items that could spread the infection.  Schools and day cares usually have rules about when a child with pink eye can return. If a child has a bacterial infection, they will probably need to stay home until they have gotten antibiotic eye drops or ointment for 24 hours.  Can pink eye be prevented? -- To keep from getting or spreading pink eye caused by an infection:   Wash your hands often with soap and water.   Try not to touch your eyes.   Avoid sharing towels, bedding, or other personal items with a person who has pink eye.  If your pink eye is caused by allergies, it might help to stay inside with the windows shut as much as possible during peak allergy seasons.  What problems should I watch for? -- Call your doctor or nurse if:   You have trouble seeing clearly after blinking.   Your eye is still red or has drainage after 3 days.   You have eye pain that is getting worse.  All topics are updated as new evidence becomes available and our peer review process is complete.  This topic retrieved from [employer] on: Feb 28, 2024.  Topic 04221 Version 20.0  Release: 32.2.4 - C32.58  © 2024 UpToDate, Inc. and/or its affiliates. All rights reserved.  figure 1: How to wash your hands     Wet your hands with clean water, and apply a small amount of soap. Lather and rub  hands together for at least 20 seconds. Clean your wrists, palms, backs of your hands, between your fingers, tips of your fingers, thumbs, and under and around your nails. Rinse well, and dry your hands using a clean towel.  Graphic 168961 Version 7.0  Consumer Information Use and Disclaimer   Disclaimer: This generalized information is a limited summary of diagnosis, treatment, and/or medication information. It is not meant to be comprehensive and should be used as a tool to help the user understand and/or assess potential diagnostic and treatment options. It does NOT include all information about conditions, treatments, medications, side effects, or risks that may apply to a specific patient. It is not intended to be medical advice or a substitute for the medical advice, diagnosis, or treatment of a health care provider based on the health care provider's examination and assessment of a patient's specific and unique circumstances. Patients must speak with a health care provider for complete information about their health, medical questions, and treatment options, including any risks or benefits regarding use of medications. This information does not endorse any treatments or medications as safe, effective, or approved for treating a specific patient. UpToDate, Inc. and its affiliates disclaim any warranty or liability relating to this information or the use thereof.The use of this information is governed by the Terms of Use, available at https://www.[employer].com/en/know/clinical-effectiveness-terms. 2024© UpToDate, Inc. and its affiliates and/or licensors. All rights reserved.  Copyright   © 2024 UpToDate, Inc. and/or its affiliates. All rights reserved.

## 2025-04-07 NOTE — LETTER
April 7, 2025     Patient: Niki Alston  YOB: 2000  Date of Visit: 4/7/2025      To Whom it May Concern:    Niki Alston is under my professional care. Niki was seen in my office on 4/7/2025. Niki may return to work on 4/9/25 .    If you have any questions or concerns, please don't hesitate to call.         Sincerely,          Mary Wagoner PA-C        CC: No Recipients

## (undated) DEVICE — SCD SEQUENTIAL COMPRESSION COMFORT SLEEVE LARGE KNEE LENGTH: Brand: KENDALL SCD

## (undated) DEVICE — GLOVE SRG LF STRL BGL SKNSNS 6.5 PF

## (undated) DEVICE — LUBRICANT SURGILUBE TUBE 4 OZ  FLIP TOP

## (undated) DEVICE — BASIC DOUBLE BASIN 2-LF: Brand: MEDLINE INDUSTRIES, INC.

## (undated) DEVICE — CYSTO TUBING TUR Y IRRIGATION

## (undated) DEVICE — SYRINGE 50ML LL

## (undated) DEVICE — INSUFLATION TUBING INSUFLOW (LEXION)

## (undated) DEVICE — CYSTOSCOPY PACK: Brand: CONVERTORS

## (undated) DEVICE — PREP PAD BNS: Brand: CONVERTORS

## (undated) DEVICE — EXIDINE 4 PCT

## (undated) DEVICE — ASTOUND STANDARD SURGICAL GOWN, XL: Brand: CONVERTORS

## (undated) DEVICE — ADHESIVE SKIN HIGH VISCOSITY EXOFIN 1ML

## (undated) DEVICE — CHLORAPREP HI-LITE 26ML ORANGE

## (undated) DEVICE — DRAPE UTILITY

## (undated) DEVICE — TROCAR: Brand: KII FIOS FIRST ENTRY

## (undated) DEVICE — PVC URETHRAL CATHETER: Brand: DOVER

## (undated) DEVICE — LAVH/LAPAROSCOPY DRAPE: Brand: CONVERTORS

## (undated) DEVICE — PERI/GYN PACK: Brand: CONVERTORS

## (undated) DEVICE — PACK GENERAL LF

## (undated) DEVICE — GLOVE INDICATOR PI UNDERGLOVE SZ 6.5 BLUE

## (undated) DEVICE — 5 MM BABCOCKS WITH RATCHET HANDLES: Brand: ENDOPATH